# Patient Record
Sex: FEMALE | Race: BLACK OR AFRICAN AMERICAN | Employment: UNEMPLOYED | URBAN - METROPOLITAN AREA
[De-identification: names, ages, dates, MRNs, and addresses within clinical notes are randomized per-mention and may not be internally consistent; named-entity substitution may affect disease eponyms.]

---

## 2018-06-08 ENCOUNTER — TELEPHONE (OUTPATIENT)
Dept: WOUND CARE | Age: 73
End: 2018-06-08

## 2018-06-11 ENCOUNTER — HOSPITAL ENCOUNTER (OUTPATIENT)
Dept: WOUND CARE | Age: 73
Discharge: HOME | End: 2018-06-11
Payer: MEDICAID

## 2018-06-11 VITALS
DIASTOLIC BLOOD PRESSURE: 82 MMHG | TEMPERATURE: 98.5 F | HEIGHT: 64 IN | SYSTOLIC BLOOD PRESSURE: 122 MMHG | RESPIRATION RATE: 18 BRPM

## 2018-06-11 PROCEDURE — 29581 APPL MULTLAYER CMPRN SYS LEG: CPT

## 2018-06-11 PROCEDURE — 99214 OFFICE O/P EST MOD 30 MIN: CPT

## 2018-06-11 RX ORDER — LIDOCAINE HYDROCHLORIDE 20 MG/ML
JELLY TOPICAL AS NEEDED
Status: DISCONTINUED | OUTPATIENT
Start: 2018-06-11 | End: 2018-06-15 | Stop reason: HOSPADM

## 2018-06-11 RX ORDER — ALBUTEROL SULFATE 2.5 MG/.5ML
2.5 SOLUTION RESPIRATORY (INHALATION)
COMMUNITY

## 2018-06-11 RX ORDER — LISINOPRIL AND HYDROCHLOROTHIAZIDE 10; 12.5 MG/1; MG/1
1 TABLET ORAL DAILY
COMMUNITY

## 2018-06-11 RX ORDER — LOPERAMIDE HCL 2 MG
2 TABLET ORAL
COMMUNITY

## 2018-06-11 RX ORDER — BISMUTH SUBSALICYLATE 262 MG
1 TABLET,CHEWABLE ORAL DAILY
COMMUNITY

## 2018-06-11 RX ORDER — CALCIUM CARBONATE/VITAMIN D3 250-3.125
1 TABLET ORAL DAILY
COMMUNITY

## 2018-06-11 RX ORDER — AMLODIPINE BESYLATE 10 MG/1
10 TABLET ORAL DAILY
COMMUNITY

## 2018-06-11 RX ORDER — ACETAMINOPHEN 325 MG/1
650 TABLET ORAL
COMMUNITY

## 2018-06-11 RX ORDER — ASCORBIC ACID 500 MG
TABLET ORAL 2 TIMES DAILY
COMMUNITY

## 2018-06-11 RX ORDER — ONDANSETRON 4 MG/1
4 TABLET, FILM COATED ORAL
COMMUNITY

## 2018-06-11 RX ORDER — ERGOCALCIFEROL 1.25 MG/1
50000 CAPSULE ORAL
COMMUNITY

## 2018-06-11 RX ORDER — IPRATROPIUM BROMIDE AND ALBUTEROL SULFATE 2.5; .5 MG/3ML; MG/3ML
3 SOLUTION RESPIRATORY (INHALATION)
COMMUNITY

## 2018-06-11 RX ORDER — GLIPIZIDE 5 MG/1
TABLET ORAL DAILY
COMMUNITY

## 2018-06-11 NOTE — WOUND CARE
06/11/18 1027   Wound Leg Medial;Right   Date First Assessed/Time First Assessed: 06/11/18 1019   POA: Yes  Wound Type: Venous  Location: Leg  Orientation: Medial;Right   DRESSING STATUS New drainage   DRESSING TYPE Gauze;Gauze wrap (tex)   Non-Pressure Injury Full thickness (subcut/muscle)   Wound Length (cm) 0.7 cm   Wound Width (cm) 1.1 cm   Wound Depth (cm) 0.2   Wound Surface area (cm^2) 0.77 cm^2   Condition of Base Pink   Tissue Type Pink;Red   Drainage Amount  Small    Drainage Color Sanguinous   Wound Odor None   Periwound Skin Condition Other (comment)  (dry, flaky )   Wound Ankle Right;Lateral   Date First Assessed/Time First Assessed: 06/11/18 1026   POA: Yes  Wound Type: Venous  Location: Ankle  Orientation: Right;Lateral   DRESSING STATUS Old drainage   DRESSING TYPE Gauze;Gauze wrap (tex)   Non-Pressure Injury Full thickness (subcut/muscle)   Wound Length (cm) 3.4 cm   Wound Width (cm) 3.5 cm   Wound Depth (cm) 0.3   Wound Surface area (cm^2) 11.9 cm^2   Condition of Base Slough   Condition of Edges Open   Tissue Type Yellow   Drainage Amount  Small    Drainage Color Purulent   Wound Odor None   Periwound Skin Condition Intact   Cleansing and Cleansing Agents  Normal saline   Wound Leg Anterior; Left   Date First Assessed/Time First Assessed: 06/11/18 1027   POA: Yes  Wound Type: (c) Other  Location: Leg  Orientation: Anterior; Left   DRESSING STATUS Clean, dry, and intact   DRESSING TYPE Gauze;Gauze wrap (tex)   Non-Pressure Injury Partial thickness (epider/derm)   Wound Length (cm) 3 cm   Wound Width (cm) 1.5 cm   Wound Depth (cm) 0.1   Wound Surface area (cm^2) 4.5 cm^2   Condition of Base Epithelializing   Drainage Amount  None   Wound Odor None   Periwound Skin Condition Intact   Cleansing and Cleansing Agents  Normal saline

## 2018-06-12 NOTE — WOUND CARE
[facility] H&P  Assessment/Plan:  Right leg venous stasis ulcers (L97.912, I87.331)    - Pt evaluated and treated. - Due to presence of non-viable tissue debridement of wound was indicated. Risks of procedure (bleeding, infection, pain) were discussed with patient, all questions/concerns were addressed, and consent was signed. Wound debrided as noted in the Procedure Note to healthy granular bleeding margins.  - Discussed how biotin can be a helpful supplement to aid in wound healing, 3-5 mg PO daily to be taken with food. - Skin lubricant applied, discussed with pt importance of moisturizing skin and how it can help prevent development of ulcerations and cellulitis. - Dressing consisting of  endoform and  applied.  - F/U 1 week. Subjective:  Pt complains of wound to both legs  Previous tx include topical medications. Neg for fever, chills, nausea, vomiting, chest pain, shortness of breath. HPI:  Ms. Jean King is a 81F who presents with wounds to her right and left legs. She has had these wounds in the past and has been treated with with topical medication. Sometimes her legs swell. She reports living in a nursing home. The wounds can sometimes be painful. History is somewhat limited. REVIEW OF SYSTEMS:   CONSTITUTIONAL: No fever, chills, weakness or fatigue. SKIN: multiple leg ulcers  CARDIOVASCULAR: No chest pain, no palpitations, no chest discomfort  RESPIRATORY: No shortness of breath, cough or sputum. GASTROINTESTINAL: No anorexia, nausea, vomiting or diarrhea. No abdominal pain or blood. NEUROLOGICAL: No headache, dizziness, syncope, paralysis, ataxia  MUSCULOSKELETAL: No muscle, back pain, joint pain or stiffness. HEMATOLOGIC: No excessive bleeding, no excessive bruising.    LYMPHATICS: No enlarged nodes, no palpable lymph node mases  PSYCHIATRIC: Denies feeling depressed, anxious   ENDOCRINOLOGIC: No reports of sweating, cold or heat intolerance. History:  Bilateral lower leg  Allergies   Allergen Reactions    Abilify [Aripiprazole] Unknown (comments)    Clozapine Unknown (comments)    Geodon [Ziprasidone Hcl] Unknown (comments)    Haldol [Haloperidol Lactate] Unknown (comments)    Thorazine [Chlorpromazine] Unknown (comments)     Family History   Problem Relation Age of Onset    Heart Disease Mother       Past Medical History:   Diagnosis Date    Chronic kidney disease     Diabetes ([facility] [address]     Hyperlipidemia     Hypertension     Ill-defined condition     Dementia      Past Surgical History:   Procedure Laterality Date    HX HEENT      bilateral cataract removed    HX ORTHOPAEDIC  3/7/13    RIGHT SHOULDER REVERSE ARTHROPLASTY     Social History   Substance Use Topics    Smoking status: Never Smoker    Smokeless tobacco: Never Used    Alcohol use No       History   Alcohol Use No     History   Drug Use No      History   Smoking Status    Never Smoker   Smokeless Tobacco    Never Used     Current Outpatient Prescriptions   Medication Sig    amLODIPine (NORVASC) 10 mg tablet Take 10 mg by mouth daily.  albuterol-ipratropium (DUO-NEB) 2.5 mg-0.5 mg/3 ml nebu 3 mL by Nebulization route.  albuterol sulfate (PROVENTIL;VENTOLIN) 2.5 mg/0.5 mL nebu nebulizer solution 2.5 mg by Nebulization route every four (4) hours as needed for Wheezing.  lidocaine/benzalkonium chlorid (A+D CRACKED SKIN RELIEF EX) by Apply Externally route.  glipiZIDE (GLUCOTROL) 5 mg tablet Take  by mouth daily.  loperamide (IMMODIUM) 2 mg tablet Take 2 mg by mouth every eight (8) hours as needed for Diarrhea.  lisinopril-hydroCHLOROthiazide (PRINZIDE, ZESTORETIC) 10-12.5 mg per tablet Take 1 Tab by mouth daily.  multivitamin (ONE A DAY) tablet Take 1 Tab by mouth daily.  calcium-vitamin D (OYSTER SHELL CALCIUM-VIT D3) 250-125 mg-unit tablet Take 1 Tab by mouth daily.     acetaminophen (TYLENOL) 325 mg tablet Take 650 mg by mouth every eight (8) hours as needed for Pain.  ascorbic acid, vitamin C, (VITAMIN C) 500 mg tablet Take  by mouth two (2) times a day.  ergocalciferol (ERGOCALCIFEROL) 50,000 unit capsule Take 50,000 Units by mouth.  ondansetron hcl (ZOFRAN) 4 mg tablet Take 4 mg by mouth every six (6) hours as needed for Nausea.  aspirin delayed-release 325 mg tablet Take 1 Tab by mouth daily.  cloNIDine (CATAPRES) 0.1 mg tablet Take 1 Tab by mouth two (2) times a day.  oxyCODONE-acetaminophen (PERCOCET) 5-325 mg per tablet Take 2 Tabs by mouth every four (4) hours as needed for Pain.  furosemide (LASIX) 40 mg tablet 1 po daily    metoprolol (LOPRESSOR) 100 mg tablet Take 1 Tab by mouth two (2) times a day.  pioglitazone (ACTOS) 30 mg tablet Take 1 Tab by mouth daily. Current Facility-Administered Medications   Medication Dose Route Frequency    lidocaine (XYLOCAINE) 2 % jelly   Topical PRN        Objective:  Visit Vitals    /82 (BP 1 Location: Right arm, BP Patient Position: At rest)    Temp 98.5 °F (36.9 °C)    Resp 18    Ht 5' 4\" (1.626 m)    Breastfeeding No       Vascular:  Right DP 1/4; PT 1/4  Left DP 1/4; PT 1/4  Capillary fill time <2 seconds  Edema: bilateral pitting edema to the legs  Skin temperature is cool  Varicosities are absent  Bilateral legs exhibit hemosiderin deposits    Dermatological:  Nails are thickened, elongated, discolored, painful to palpation, 2 mm thick, with subungual debris. Skin is dry and scaly   Skin is normal temp and turgor  No evidence of tinea pedis  There is no maceration of the interspaces of the feet b/l. Hyperkeratotic lesions present at not present. Wound #1  Location: right leg medial  Etiology: venous  Size: see nursing notes  Margins: hyperkeratotic  Drainage: serous  Odor: none  Wound base: slough/fibrin  Lymphangitic streaking? No.  Undermining? No.  Sinus tracts? No.  Probes to bone? NO  Subcutaneous crepitus? No  Fluctuance?  No    Wound #2  Location: right leg lateral  Etiology: venous  Size: see nursing notes  Margins: hyperkeratotic  Drainage: serous  Odor: none  Wound base: slough/fibrin  Lymphangitic streaking? No.  Undermining? No.  Sinus tracts? No.  Probes to bone? No  Subcutaneous crepitus? No  Fluctuance? No     Wound #3  Location: left leg anterior  No wound, but maybe one previously    Neurological:  Protective sensation per 5.07 [place] Annita monofilament is reduced bilaterally  Vibratory sensation at the Rt 1st MPJ is present LT 1st MPJ present  Epicritic sensation is intact. Patient is AAOx3, mood is normal.     Orthopedic:  B/L LE are symmetric  ROM of ankle, STJ, 1st MTPJ is limited, MMT 5 out of 5 for B/L LE. No pedal amputations    Constitutional: Pt is a well developed, overweight  73F. Lymphatics: negative tenderness to palpation of neck/axillary/inguinal nodes. Imaging / Labs / Cx / Px:    4/17/18 JEFF/PVR Right 1.0 from [facility] [address], [facility], [facility], [address] [place] [address]. Juan [address] [facility], [place], [address] [facility]  P: [phone]  F: [phone]

## 2018-06-13 ENCOUNTER — TELEPHONE (OUTPATIENT)
Dept: WOUND CARE | Age: 73
End: 2018-06-13

## 2018-06-19 ENCOUNTER — APPOINTMENT (OUTPATIENT)
Dept: WOUND CARE | Age: 73
End: 2018-06-19

## 2018-06-25 ENCOUNTER — HOSPITAL ENCOUNTER (OUTPATIENT)
Dept: WOUND CARE | Age: 73
Discharge: HOME | End: 2018-06-25
Payer: MEDICAID

## 2018-06-25 VITALS
RESPIRATION RATE: 17 BRPM | SYSTOLIC BLOOD PRESSURE: 104 MMHG | WEIGHT: 239 LBS | DIASTOLIC BLOOD PRESSURE: 71 MMHG | TEMPERATURE: 97.5 F | BODY MASS INDEX: 41.02 KG/M2

## 2018-06-25 PROCEDURE — 11042 DBRDMT SUBQ TIS 1ST 20SQCM/<: CPT

## 2018-06-25 PROCEDURE — 74011000250 HC RX REV CODE- 250: Performed by: PODIATRIST

## 2018-06-25 RX ORDER — LIDOCAINE HYDROCHLORIDE 20 MG/ML
JELLY TOPICAL AS NEEDED
Status: DISCONTINUED | OUTPATIENT
Start: 2018-06-25 | End: 2018-06-29 | Stop reason: HOSPADM

## 2018-06-25 RX ORDER — SULFAMETHOXAZOLE AND TRIMETHOPRIM 800; 160 MG/1; MG/1
1 TABLET ORAL 2 TIMES DAILY
COMMUNITY
Start: 2018-06-19 | End: 2018-06-29

## 2018-06-25 RX ADMIN — LIDOCAINE HYDROCHLORIDE: 20 JELLY TOPICAL at 10:23

## 2018-06-25 NOTE — WOUND CARE
06/25/18 1016   Wound Heel Left   Date First Assessed/Time First Assessed: 06/25/18 1018   POA: Yes  Wound Type: Pressure injury  Location: Heel  Orientation: Left   DRESSING STATUS Other (comment)  (none)   Pressure Injury DTI   Wound Length (cm) 3.5 cm   Wound Width (cm) 7.5 cm   Wound Depth (cm) 0.1   Wound Surface area (cm^2) 26.25 cm^2   Tissue Type Maroon/purple;Black   Wound Leg Medial;Right   Date First Assessed/Time First Assessed: 06/11/18 1019   POA: Yes  Wound Type: Venous  Location: Leg  Orientation: Medial;Right   DRESSING STATUS Old drainage;Removed   DRESSING TYPE Silver products;Gauze;Gauze wrap (tex)   Non-Pressure Injury Full thickness (subcut/muscle)   Wound Length (cm) 1.6 cm   Wound Width (cm) 1 cm   Wound Depth (cm) 0.1   Wound Surface area (cm^2) 1.6 cm^2   Condition of Base Pink;Granulation   Tissue Type Pink   Drainage Amount  Small    Drainage Color Serous   Wound Odor None   Periwound Skin Condition Other (comment)  (Dry flaky skin)   Cleansing and Cleansing Agents  Normal saline   Wound Ankle Right;Lateral   Date First Assessed/Time First Assessed: 06/11/18 1026   POA: Yes  Wound Type: Venous  Location: Ankle  Orientation: Right;Lateral   DRESSING STATUS Old drainage;Removed   DRESSING TYPE Silver products;Gauze;Gauze wrap (tex)   Non-Pressure Injury Full thickness (subcut/muscle)   Wound Length (cm) 1.4 cm   Wound Width (cm) 2.5 cm   Wound Depth (cm) 0.5   Wound Surface area (cm^2) 3.5 cm^2   Condition of Base Slough;Pink   Tissue Type Yellow;Pink   Drainage Amount  Small    Drainage Color Tan   Wound Odor None   Periwound Skin Condition Intact   Cleansing and Cleansing Agents  Normal saline   Wound Leg Anterior; Left   Date First Assessed/Time First Assessed: 06/11/18 1027   POA: Yes  Wound Type: (c) Other  Location: Leg  Orientation: Anterior; Left   DRESSING STATUS Other (comment)  (none)   Wound Length (cm) 0 cm   Wound Width (cm) 0 cm   Wound Depth (cm) 0   Wound Surface area (cm^2) 0 cm^2   Condition of Base Epithelializing

## 2018-06-25 NOTE — WOUND CARE
[facility] Follow up   Assessment/Plan:  Right leg venous stasis ulcers (L97.912, I87.331), Stage 1 right pressure heel ulcer (L89.601)    - Pt evaluated and treated. - Due to presence of non-viable tissue debridement of wound was indicated. Risks of procedure (bleeding, infection, pain) were discussed with patient, all questions/concerns were addressed, and consent was signed. Wound debrided as noted in the Procedure Note to healthy granular bleeding margins.  - Discussed how biotin can be a helpful supplement to aid in wound healing, 3-5 mg PO daily to be taken with food. - Skin lubricant applied, discussed with pt importance of moisturizing skin and how it can help prevent development of ulcerations and cellulitis. - Dressing consisting of  endoform DSD  Applied.  - Compressed with 3 layer compression  - Recommend offloading right heel with pillows or prevelons at all times. - F/U 1 week. Subjective:  Pt complains of wound to right medial and lateral leg. Wounds improving with compression. Complains of pain to right heel. Neg for fever, chills, nausea, vomiting, chest pain, shortness of breath. HPI:  Ms. Kristen Steve is a 81F who presents with wounds to her right and left legs. She has had these wounds in the past and has been treated with with topical medication. Sometimes her legs swell. She reports living in a nursing home. The wounds can sometimes be painful. History is somewhat limited. REVIEW OF SYSTEMS:   CONSTITUTIONAL: No fever, chills, weakness or fatigue. SKIN: multiple leg ulcers  CARDIOVASCULAR: No chest pain, no palpitations, no chest discomfort  RESPIRATORY: No shortness of breath, cough or sputum. GASTROINTESTINAL: No anorexia, nausea, vomiting or diarrhea. No abdominal pain or blood. NEUROLOGICAL: No headache, dizziness, syncope, paralysis, ataxia  MUSCULOSKELETAL: No muscle, back pain, joint pain or stiffness.    HEMATOLOGIC: No excessive bleeding, no excessive bruising. LYMPHATICS: No enlarged nodes, no palpable lymph node mases  PSYCHIATRIC: Denies feeling depressed, anxious   ENDOCRINOLOGIC: No reports of sweating, cold or heat intolerance. History:  Bilateral lower leg  Allergies   Allergen Reactions    Abilify [Aripiprazole] Unknown (comments)    Clozapine Unknown (comments)    Geodon [Ziprasidone Hcl] Unknown (comments)    Haldol [Haloperidol Lactate] Unknown (comments)    Thorazine [Chlorpromazine] Unknown (comments)     Family History   Problem Relation Age of Onset    Heart Disease Mother       Past Medical History:   Diagnosis Date    Chronic kidney disease     Diabetes ([facility] [address]     Hyperlipidemia     Hypertension     Ill-defined condition     Dementia      Past Surgical History:   Procedure Laterality Date    HX HEENT      bilateral cataract removed    HX ORTHOPAEDIC  3/7/13    RIGHT SHOULDER REVERSE ARTHROPLASTY     Social History   Substance Use Topics    Smoking status: Never Smoker    Smokeless tobacco: Never Used    Alcohol use No       History   Alcohol Use No     History   Drug Use No      History   Smoking Status    Never Smoker   Smokeless Tobacco    Never Used     Current Outpatient Prescriptions   Medication Sig    trimethoprim-sulfamethoxazole (BACTRIM DS) 160-800 mg per tablet Take 1 Tab by mouth two (2) times a day.  ascorbic acid, vitamin C, (VITAMIN C) 500 mg tablet Take  by mouth two (2) times a day.  amLODIPine (NORVASC) 10 mg tablet Take 10 mg by mouth daily.  albuterol-ipratropium (DUO-NEB) 2.5 mg-0.5 mg/3 ml nebu 3 mL by Nebulization route.  albuterol sulfate (PROVENTIL;VENTOLIN) 2.5 mg/0.5 mL nebu nebulizer solution 2.5 mg by Nebulization route every four (4) hours as needed for Wheezing.  lidocaine/benzalkonium chlorid (A+D CRACKED SKIN RELIEF EX) by Apply Externally route.  glipiZIDE (GLUCOTROL) 5 mg tablet Take  by mouth daily.     loperamide (IMMODIUM) 2 mg tablet Take 2 mg by mouth every eight (8) hours as needed for Diarrhea.  lisinopril-hydroCHLOROthiazide (PRINZIDE, ZESTORETIC) 10-12.5 mg per tablet Take 1 Tab by mouth daily.  multivitamin (ONE A DAY) tablet Take 1 Tab by mouth daily.  calcium-vitamin D (OYSTER SHELL CALCIUM-VIT D3) 250-125 mg-unit tablet Take 1 Tab by mouth daily.  acetaminophen (TYLENOL) 325 mg tablet Take 650 mg by mouth every eight (8) hours as needed for Pain.  ergocalciferol (ERGOCALCIFEROL) 50,000 unit capsule Take 50,000 Units by mouth.  ondansetron hcl (ZOFRAN) 4 mg tablet Take 4 mg by mouth every six (6) hours as needed for Nausea.  aspirin delayed-release 325 mg tablet Take 1 Tab by mouth daily.  cloNIDine (CATAPRES) 0.1 mg tablet Take 1 Tab by mouth two (2) times a day.  oxyCODONE-acetaminophen (PERCOCET) 5-325 mg per tablet Take 2 Tabs by mouth every four (4) hours as needed for Pain.  furosemide (LASIX) 40 mg tablet 1 po daily    metoprolol (LOPRESSOR) 100 mg tablet Take 1 Tab by mouth two (2) times a day.  pioglitazone (ACTOS) 30 mg tablet Take 1 Tab by mouth daily. Current Facility-Administered Medications   Medication Dose Route Frequency    lidocaine (XYLOCAINE) 2 % jelly   Topical PRN        Objective:  Visit Vitals    /71 (BP 1 Location: Right arm, BP Patient Position: Sitting)    Temp 97.5 °F (36.4 °C)    Resp 17    Wt 108.4 kg (239 lb)    BMI 41.02 kg/m2       Vascular:  Right DP 1/4; PT 1/4  Left DP 1/4; PT 1/4  Capillary fill time <2 seconds  Edema: bilateral pitting edema to the legs  Skin temperature is cool  Varicosities are absent  Bilateral legs exhibit hemosiderin deposits    Dermatological:  Nails are thickened, elongated, discolored, painful to palpation, 2 mm thick, with subungual debris. Skin is dry and scaly   Skin is normal temp and turgor  No evidence of tinea pedis  There is no maceration of the interspaces of the feet b/l.     Hyperkeratotic lesions present at not present. Wound #1  Location: right leg medial  Etiology: venous  Size: 1.6x1x0.1cm  Margins: hyperkeratotic  Drainage: serous  Odor: none  Wound base: granular  Lymphangitic streaking? No.  Undermining? No.  Sinus tracts? No.  Probes to bone? NO  Subcutaneous crepitus? No  Fluctuance? No    Wound #2  Location: right leg lateral  Etiology: venous  Size: 1.4x2.5x0.5cm  Margins: hyperkeratotic  Drainage: serous  Odor: none  Wound base: slough/fibrin  Lymphangitic streaking? No.  Undermining? No.  Sinus tracts? No.  Probes to bone? No  Subcutaneous crepitus? No  Fluctuance? No     Wound #3  Location: right heel  Etiology: pressure   Size: 3.5x7.5x0.1cm  Margins: deep tissue injury  Drainage: none  Odor: none  Wound base: skin   Lymphangitic streaking? No.  Undermining? No.  Sinus tracts? No.  Probes to bone? No  Subcutaneous crepitus? No  Fluctuance? No     Neurological:  Protective sensation per 5.07 [place] Annita monofilament is reduced bilaterally  Vibratory sensation at the Rt 1st MPJ is present LT 1st MPJ present  Epicritic sensation is intact. Patient is AAOx3, mood is normal.     Orthopedic:  B/L LE are symmetric  ROM of ankle, STJ, 1st MTPJ is limited, MMT 5 out of 5 for B/L LE. No pedal amputations    Constitutional: Pt is a well developed, overweight  73F. Lymphatics: negative tenderness to palpation of neck/axillary/inguinal nodes. Imaging / Labs / Cx / Px:    4/17/18 JEFF/PVR Right 1.0 from [address] [place], [facility] Ryan [place], [address] [facility], [address] [place] [address]. [address], [place] [address], [place], [address] [facility]  P: [phone]  F: [phone]

## 2018-06-27 ENCOUNTER — APPOINTMENT (OUTPATIENT)
Dept: WOUND CARE | Age: 73
End: 2018-06-27

## 2018-07-03 ENCOUNTER — HOSPITAL ENCOUNTER (OUTPATIENT)
Dept: WOUND CARE | Age: 73
Discharge: HOME | End: 2018-07-03
Payer: MEDICAID

## 2018-07-03 VITALS — RESPIRATION RATE: 16 BRPM | TEMPERATURE: 97.6 F | DIASTOLIC BLOOD PRESSURE: 100 MMHG | SYSTOLIC BLOOD PRESSURE: 141 MMHG

## 2018-07-03 PROCEDURE — 29581 APPL MULTLAYER CMPRN SYS LEG: CPT

## 2018-07-03 PROCEDURE — 74011000250 HC RX REV CODE- 250: Performed by: PODIATRIST

## 2018-07-03 RX ORDER — LIDOCAINE HYDROCHLORIDE 20 MG/ML
JELLY TOPICAL ONCE
Status: COMPLETED | OUTPATIENT
Start: 2018-07-03 | End: 2018-07-03

## 2018-07-03 RX ADMIN — LIDOCAINE HYDROCHLORIDE: 20 JELLY TOPICAL at 14:20

## 2018-07-03 NOTE — WOUND CARE
[address] [place] [facility]P  Assessment/Plan:  Right leg venous stasis ulcers (L97.912, I87.331), Left heel DTI (L89.620)    - Pt evaluated and treated. - Will change to Aquacel Ag+ and DSD applied for the right leg wounds  - Compressed with 3 layer compression on the right  - Betadine open to air for the left heel  - Recommend offloading right heel with pillows or prevalons at all times. Discussed with  from NH to make sure that she gets an offloading boot. Written on orders to NH.   - F/U 1 week. Subjective:  Pt complains of wound to both legs. Right leg is less swollen today. Neg for fever, chills, nausea, vomiting, chest pain, shortness of breath. HPI:  Ms. Jared Ortega is a 81F who presents with wounds to her right and left legs. She has had these wounds in the past and has been treated with with topical medication. Sometimes her legs swell. She reports living in a nursing home. The wounds can sometimes be painful. History is somewhat limited.      History:  Bilateral lower leg  Allergies   Allergen Reactions    Abilify [Aripiprazole] Unknown (comments)    Clozapine Unknown (comments)    Geodon [Ziprasidone Hcl] Unknown (comments)    Haldol [Haloperidol Lactate] Unknown (comments)    Thorazine [Chlorpromazine] Unknown (comments)     Family History   Problem Relation Age of Onset    Heart Disease Mother       Past Medical History:   Diagnosis Date    Chronic kidney disease     Diabetes ([facility] [address]     Hyperlipidemia     Hypertension     Ill-defined condition     Dementia      Past Surgical History:   Procedure Laterality Date    HX HEENT      bilateral cataract removed    HX ORTHOPAEDIC  3/7/13    RIGHT SHOULDER REVERSE ARTHROPLASTY     Social History   Substance Use Topics    Smoking status: Never Smoker    Smokeless tobacco: Never Used    Alcohol use No       History   Alcohol Use No     History   Drug Use No      History   Smoking Status    Never Smoker   Smokeless Tobacco    Never Used     Current Outpatient Prescriptions   Medication Sig    amLODIPine (NORVASC) 10 mg tablet Take 10 mg by mouth daily.  albuterol-ipratropium (DUO-NEB) 2.5 mg-0.5 mg/3 ml nebu 3 mL by Nebulization route.  albuterol sulfate (PROVENTIL;VENTOLIN) 2.5 mg/0.5 mL nebu nebulizer solution 2.5 mg by Nebulization route every four (4) hours as needed for Wheezing.  lidocaine/benzalkonium chlorid (A+D CRACKED SKIN RELIEF EX) by Apply Externally route.  glipiZIDE (GLUCOTROL) 5 mg tablet Take  by mouth daily.  loperamide (IMMODIUM) 2 mg tablet Take 2 mg by mouth every eight (8) hours as needed for Diarrhea.  lisinopril-hydroCHLOROthiazide (PRINZIDE, ZESTORETIC) 10-12.5 mg per tablet Take 1 Tab by mouth daily.  multivitamin (ONE A DAY) tablet Take 1 Tab by mouth daily.  calcium-vitamin D (OYSTER SHELL CALCIUM-VIT D3) 250-125 mg-unit tablet Take 1 Tab by mouth daily.  acetaminophen (TYLENOL) 325 mg tablet Take 650 mg by mouth every eight (8) hours as needed for Pain.  ascorbic acid, vitamin C, (VITAMIN C) 500 mg tablet Take  by mouth two (2) times a day.  ergocalciferol (ERGOCALCIFEROL) 50,000 unit capsule Take 50,000 Units by mouth.  ondansetron hcl (ZOFRAN) 4 mg tablet Take 4 mg by mouth every six (6) hours as needed for Nausea.  aspirin delayed-release 325 mg tablet Take 1 Tab by mouth daily.  cloNIDine (CATAPRES) 0.1 mg tablet Take 1 Tab by mouth two (2) times a day.  oxyCODONE-acetaminophen (PERCOCET) 5-325 mg per tablet Take 2 Tabs by mouth every four (4) hours as needed for Pain.  furosemide (LASIX) 40 mg tablet 1 po daily    metoprolol (LOPRESSOR) 100 mg tablet Take 1 Tab by mouth two (2) times a day.  pioglitazone (ACTOS) 30 mg tablet Take 1 Tab by mouth daily. No current facility-administered medications for this encounter.          Objective:  Visit Vitals    BP (!) 141/100 (BP 1 Location: Right arm, BP Patient Position: Sitting)    Temp 97.6 °F (36.4 °C)    Resp 16       Vascular:  Right DP 1/4; PT 1/4  Left DP 1/4; PT 1/4  Capillary fill time <2 seconds  Edema: bilateral pitting edema to the legs  Skin temperature is cool  Varicosities are absent  Bilateral legs exhibit hemosiderin deposits    Dermatological:  Nails are thickened, elongated, discolored, painful to palpation, 2 mm thick, with subungual debris. Skin is dry and scaly   Skin is normal temp and turgor  No evidence of tinea pedis  There is no maceration of the interspaces of the feet b/l. Hyperkeratotic lesions present at not present. Wound #1  Location: right leg medial  Etiology: venous  Size: see nursing notes  Margins: hyperkeratotic  Drainage: serous  Odor: none  Wound base: less slough/fibrin  Lymphangitic streaking? No.  Undermining? No.  Sinus tracts? No.  Probes to bone? No  Subcutaneous crepitus? No  Fluctuance? No    Wound #2  Location: right leg lateral  Etiology: venous  Size: see nursing notes  Margins: hyperkeratotic  Drainage: serous  Odor: none  Wound base: less slough/fibrin  Lymphangitic streaking? No.  Undermining? No.  Sinus tracts? No.  Probes to bone? No  Subcutaneous crepitus? No  Fluctuance? No     Wound #3  Location: left heel DTI  Etiology: pressure   Size: see nursing notes  Margins: deep tissue injury  Drainage: none  Odor: none  Wound base: skin is intact, dark maroon color  Lymphangitic streaking? No.  Undermining? No.  Sinus tracts? No.  Probes to bone? No  Subcutaneous crepitus? No  Fluctuance? No     Neurological:  Protective sensation per 5.07 [place] Annita monofilament is reduced bilaterally  Vibratory sensation at the Rt 1st MPJ is present LT 1st MPJ present  Epicritic sensation is intact. Patient is AAOx3, mood is normal.     Orthopedic:  B/L LE are symmetric  ROM of ankle, STJ, 1st MTPJ is limited, MMT 5 out of 5 for B/L LE.     No pedal amputations    Constitutional: Pt is a well developed, overweight  73F.    Lymphatics: negative tenderness to palpation of neck/axillary/inguinal nodes. Imaging / Labs / Cx / Px:    4/17/18 JEFF/PVR Right 1.0 from Jose Antonio Weinberg DPM - Dre Smith, [address] [place] [address], [address] [place] [address]. Juan C [address] [place], [facility], [place], [address] [facility]  P: [phone]  F: [phone]

## 2018-07-03 NOTE — WOUND CARE
07/03/18 1420   Wound Heel Left   Date First Assessed/Time First Assessed: 06/25/18 1018   POA: Yes  Wound Type: Pressure injury  Location: Heel  Orientation: Left   DRESSING STATUS Clean, dry, and intact   Pressure Injury DTI   Wound Length (cm) 5.4 cm   Wound Width (cm) 6 cm   Wound Depth (cm) 0   Wound Surface area (cm^2) 32.4 cm^2   Tissue Type Maroon/purple   Cleansing and Cleansing Agents  Normal saline   Wound Leg Medial;Right   Date First Assessed/Time First Assessed: 06/11/18 1019   POA: Yes  Wound Type: Venous  Location: Leg  Orientation: Medial;Right   DRESSING STATUS Clean, dry, and intact   Wound Length (cm) 0.1 cm   Wound Width (cm) 0.1 cm   Wound Depth (cm) 0   Wound Surface area (cm^2) 0.01 cm^2   Condition of Base Pink;Epithelializing   Drainage Amount  None   Wound Odor None   Cleansing and Cleansing Agents  Normal saline   Wound Ankle Right;Lateral   Date First Assessed/Time First Assessed: 06/11/18 1026   POA: Yes  Wound Type: Venous  Location: Ankle  Orientation: Right;Lateral   DRESSING STATUS Old drainage   Wound Length (cm) 1.8 cm   Wound Width (cm) 2.3 cm   Wound Depth (cm) 0.5   Wound Surface area (cm^2) 4.14 cm^2   Condition of Base Pink   Drainage Amount  Small    Drainage Color Serosanguinous   Wound Odor None   Cleansing and Cleansing Agents  Normal saline

## 2018-07-10 ENCOUNTER — APPOINTMENT (OUTPATIENT)
Dept: WOUND CARE | Age: 73
End: 2018-07-10
Payer: MEDICAID

## 2018-07-11 ENCOUNTER — HOSPITAL ENCOUNTER (OUTPATIENT)
Dept: WOUND CARE | Age: 73
Discharge: HOME | End: 2018-07-11
Payer: MEDICAID

## 2018-07-11 VITALS
HEART RATE: 65 BPM | DIASTOLIC BLOOD PRESSURE: 81 MMHG | TEMPERATURE: 97.5 F | SYSTOLIC BLOOD PRESSURE: 139 MMHG | RESPIRATION RATE: 17 BRPM

## 2018-07-11 PROCEDURE — 11042 DBRDMT SUBQ TIS 1ST 20SQCM/<: CPT

## 2018-07-11 PROCEDURE — 74011000250 HC RX REV CODE- 250: Performed by: PODIATRIST

## 2018-07-11 RX ORDER — LIDOCAINE 40 MG/G
CREAM TOPICAL
Status: COMPLETED | OUTPATIENT
Start: 2018-07-11 | End: 2018-07-11

## 2018-07-11 RX ADMIN — LIDOCAINE: 4 CREAM TOPICAL at 11:42

## 2018-07-11 NOTE — WOUND CARE
07/11/18 1134 Wound Heel Left Date First Assessed/Time First Assessed: 06/25/18 1018   POA: Yes  Wound Type: Pressure injury  Location: Heel  Orientation: Left Pressure Injury DTI Wound Length (cm) 4.5 cm Wound Width (cm) 6 cm Wound Depth (cm) 0 Wound Surface area (cm^2) 27 cm^2 Tissue Type Maroon/purple Drainage Amount  None Wound Odor None Wound Leg Medial;Right Date First Assessed/Time First Assessed: 06/11/18 1019   POA: Yes  Wound Type: Venous  Location: Leg  Orientation: Medial;Right DRESSING TYPE Compression Wrap/Venous Stasis Non-Pressure Injury Partial thickness (epider/derm) Wound Length (cm) 0.1 cm Wound Width (cm) 0.1 cm Wound Depth (cm) 0.1 Wound Surface area (cm^2) 0.01 cm^2 Condition of Base Pink;Epithelializing Wound Leg Right;Lateral;Posterior Date First Assessed/Time First Assessed: 06/11/18 1026   POA: Yes  Wound Type: Venous  Location: Leg  Orientation: Right;Lateral;Posterior DRESSING STATUS Old drainage DRESSING TYPE Compression Wrap/Venous Stasis Non-Pressure Injury Full thickness (subcut/muscle) Wound Length (cm) 1.9 cm Wound Width (cm) 2.6 cm Wound Depth (cm) 0.5 Wound Surface area (cm^2) 4.94 cm^2 Condition of Base Pink Condition of Edges Open Tissue Type Red Drainage Amount  Moderate Drainage Color Purulent Wound Odor None Periwound Skin Condition Intact Cleansing and Cleansing Agents  Normal saline

## 2018-07-12 NOTE — WOUND CARE
Podiatric Surgery - [address] - Progress Note  Assessment/Plan:  Right leg venous stasis ulcers (L97.912, I87.331), Left heel DTI (L89.620)    - Pt evaluated and treated. - Will continue with Aquacel Ag+ and DSD, as the medial ulceration has healed with this, will add zinc oxide to periwound. - Compressed with 3 layer compression on the right  - Betadine open to air for the left heel  - Recommend offloading right heel with pillows or prevalons at all times. Discussed with  from NH to make sure that she gets an offloading boot. Written on orders to NH.   - F/U 1 week. Subjective:  Pt complains of wounds to both legs. Right leg is less swollen today, thinks one ulcer has healed. Neg for fever, chills, nausea, vomiting, chest pain, shortness of breath. HPI:  Ms. Robert Barnes is a 81F who presents with wounds to her right and left legs. She has had these wounds in the past and has been treated with with topical medication. Sometimes her legs swell. She reports living in a nursing home. The wounds can sometimes be painful. History is somewhat limited.      History:  Bilateral lower leg  Allergies   Allergen Reactions    Abilify [Aripiprazole] Unknown (comments)    Clozapine Unknown (comments)    Geodon [Ziprasidone Hcl] Unknown (comments)    Haldol [Haloperidol Lactate] Unknown (comments)    Thorazine [Chlorpromazine] Unknown (comments)     Family History   Problem Relation Age of Onset    Heart Disease Mother       Past Medical History:   Diagnosis Date    Chronic kidney disease     Diabetes ([facility] [address]     Hyperlipidemia     Hypertension     Ill-defined condition     Dementia      Past Surgical History:   Procedure Laterality Date    HX HEENT      bilateral cataract removed    HX ORTHOPAEDIC  3/7/13    RIGHT SHOULDER REVERSE ARTHROPLASTY     Social History   Substance Use Topics    Smoking status: Never Smoker    Smokeless tobacco: Never Used    Alcohol use No History   Alcohol Use No     History   Drug Use No      History   Smoking Status    Never Smoker   Smokeless Tobacco    Never Used     Current Outpatient Prescriptions   Medication Sig    amLODIPine (NORVASC) 10 mg tablet Take 10 mg by mouth daily.  albuterol-ipratropium (DUO-NEB) 2.5 mg-0.5 mg/3 ml nebu 3 mL by Nebulization route.  albuterol sulfate (PROVENTIL;VENTOLIN) 2.5 mg/0.5 mL nebu nebulizer solution 2.5 mg by Nebulization route every four (4) hours as needed for Wheezing.  lidocaine/benzalkonium chlorid (A+D CRACKED SKIN RELIEF EX) by Apply Externally route.  glipiZIDE (GLUCOTROL) 5 mg tablet Take  by mouth daily.  loperamide (IMMODIUM) 2 mg tablet Take 2 mg by mouth every eight (8) hours as needed for Diarrhea.  lisinopril-hydroCHLOROthiazide (PRINZIDE, ZESTORETIC) 10-12.5 mg per tablet Take 1 Tab by mouth daily.  multivitamin (ONE A DAY) tablet Take 1 Tab by mouth daily.  calcium-vitamin D (OYSTER SHELL CALCIUM-VIT D3) 250-125 mg-unit tablet Take 1 Tab by mouth daily.  acetaminophen (TYLENOL) 325 mg tablet Take 650 mg by mouth every eight (8) hours as needed for Pain.  ascorbic acid, vitamin C, (VITAMIN C) 500 mg tablet Take  by mouth two (2) times a day.  ergocalciferol (ERGOCALCIFEROL) 50,000 unit capsule Take 50,000 Units by mouth.  ondansetron hcl (ZOFRAN) 4 mg tablet Take 4 mg by mouth every six (6) hours as needed for Nausea.  aspirin delayed-release 325 mg tablet Take 1 Tab by mouth daily.  cloNIDine (CATAPRES) 0.1 mg tablet Take 1 Tab by mouth two (2) times a day.  oxyCODONE-acetaminophen (PERCOCET) 5-325 mg per tablet Take 2 Tabs by mouth every four (4) hours as needed for Pain.  furosemide (LASIX) 40 mg tablet 1 po daily    metoprolol (LOPRESSOR) 100 mg tablet Take 1 Tab by mouth two (2) times a day.  pioglitazone (ACTOS) 30 mg tablet Take 1 Tab by mouth daily. No current facility-administered medications for this encounter. Objective:  Visit Vitals    /81 (BP 1 Location: Right arm, BP Patient Position: At rest)    Pulse 65    Temp 97.5 °F (36.4 °C)    Resp 17       Vascular:  Right DP 1/4; PT 1/4  Left DP 1/4; PT 1/4  Capillary fill time <2 seconds  Edema: bilateral pitting edema to the legs  Skin temperature is cool  Varicosities are absent  Bilateral legs exhibit hemosiderin deposits    Dermatological:  Nails are thickened, elongated, discolored, painful to palpation, 2 mm thick, with subungual debris. Skin is dry and scaly   Skin is normal temp and turgor  No evidence of tinea pedis  There is no maceration of the interspaces of the feet b/l. Hyperkeratotic lesions present at not present. Wound #1  Location: right leg medial  Etiology: venous  Size: healed    Wound #2  Location: right leg lateral  Etiology: venous  Size: see nursing notes  Margins: hyperkeratotic  Drainage: serous  Odor: none  Wound base: less slough/fibrin  Lymphangitic streaking? No.  Undermining? No.  Sinus tracts? No.  Probes to bone? No  Subcutaneous crepitus? No  Fluctuance? No     Wound #3  Location: left heel DTI  Etiology: pressure   Size: see nursing notes  Margins: deep tissue injury  Drainage: none  Odor: none  Wound base: skin is intact, dark maroon color, well adhered dried blister  Lymphangitic streaking? No.  Undermining? No.  Sinus tracts? No.  Probes to bone? No  Subcutaneous crepitus? No  Fluctuance? No     Neurological:  Protective sensation per 5.07 [place] Annita monofilament is reduced bilaterally  Vibratory sensation at the Rt 1st MPJ is present LT 1st MPJ present  Epicritic sensation is intact. Patient is AAOx3, mood is normal.     Orthopedic:  B/L LE are symmetric  ROM of ankle, STJ, 1st MTPJ is limited, MMT 5 out of 5 for B/L LE. No pedal amputations    Constitutional: Pt is a well developed, overweight  73F. Lymphatics: negative tenderness to palpation of neck/axillary/inguinal nodes.     Imaging / Mariely Woodson / Cx / Px:    4/17/18 JEFF/PVR Right 1.0 from Jose Antonio [address] [place] DPM - Kurtis Rodriguez, [address] [place] [address], [address] [place] [address] Sophy Irizarry [address] [place] [address], [address] [facility]  P: [phone]  F: [phone]

## 2018-07-18 ENCOUNTER — HOSPITAL ENCOUNTER (OUTPATIENT)
Dept: WOUND CARE | Age: 73
End: 2018-07-18
Payer: MEDICAID

## 2018-07-24 ENCOUNTER — TELEPHONE (OUTPATIENT)
Dept: WOUND CARE | Age: 73
End: 2018-07-24

## 2018-07-25 ENCOUNTER — HOSPITAL ENCOUNTER (OUTPATIENT)
Dept: WOUND CARE | Age: 73
Discharge: HOME | End: 2018-07-25
Payer: MEDICAID

## 2018-07-25 VITALS
TEMPERATURE: 97.7 F | RESPIRATION RATE: 16 BRPM | SYSTOLIC BLOOD PRESSURE: 140 MMHG | HEART RATE: 66 BPM | DIASTOLIC BLOOD PRESSURE: 78 MMHG

## 2018-07-25 PROCEDURE — 99215 OFFICE O/P EST HI 40 MIN: CPT

## 2018-07-25 PROCEDURE — 74011000250 HC RX REV CODE- 250: Performed by: PODIATRIST

## 2018-07-25 RX ORDER — LIDOCAINE 40 MG/G
CREAM TOPICAL
Status: COMPLETED | OUTPATIENT
Start: 2018-07-25 | End: 2018-07-25

## 2018-07-25 RX ADMIN — LIDOCAINE: 4 CREAM TOPICAL at 10:50

## 2018-07-25 NOTE — WOUND CARE
Podiatric Surgery - [address] - Progress Note  Assessment/Plan:  Right leg venous stasis ulcers (L97.912, I87.331), Left heel DTI (L89.620)    - Pt evaluated and treated. - Will continue with Aquacel Ag+, zinc oxide to periwound, and DSD, as the medial ulceration has healed with this. - Double tubigrips b/l  - Betadine open to air for the left heel  - Recommend offloading right heel with pillows or prevalons at all times. Discussed with  from NH to make sure that she gets an offloading boot. Written on orders to NH.   - F/U 1 week. Subjective:  Pt complains of wounds to both legs. Legs have been swollen and skin opened up on LT. Neg for fever, chills, nausea, vomiting, chest pain, shortness of breath. HPI:  Ms. Yousif Lennon is a 81F who presents with wounds to her right and left legs. She has had these wounds in the past and has been treated with with topical medication. Sometimes her legs swell. She reports living in a nursing home. The wounds can sometimes be painful. History is somewhat limited.      History:  Bilateral lower leg  Allergies   Allergen Reactions    Abilify [Aripiprazole] Unknown (comments)    Clozapine Unknown (comments)    Geodon [Ziprasidone Hcl] Unknown (comments)    Haldol [Haloperidol Lactate] Unknown (comments)    Thorazine [Chlorpromazine] Unknown (comments)     Family History   Problem Relation Age of Onset    Heart Disease Mother       Past Medical History:   Diagnosis Date    Chronic kidney disease     Diabetes ([facility] [address]     Hyperlipidemia     Hypertension     Ill-defined condition     Dementia      Past Surgical History:   Procedure Laterality Date    HX HEENT      bilateral cataract removed    HX ORTHOPAEDIC  3/7/13    RIGHT SHOULDER REVERSE ARTHROPLASTY     Social History   Substance Use Topics    Smoking status: Never Smoker    Smokeless tobacco: Never Used    Alcohol use No       History   Alcohol Use No     History   Drug Use No      History   Smoking Status    Never Smoker   Smokeless Tobacco    Never Used     Current Outpatient Prescriptions   Medication Sig    amLODIPine (NORVASC) 10 mg tablet Take 10 mg by mouth daily.  albuterol-ipratropium (DUO-NEB) 2.5 mg-0.5 mg/3 ml nebu 3 mL by Nebulization route.  albuterol sulfate (PROVENTIL;VENTOLIN) 2.5 mg/0.5 mL nebu nebulizer solution 2.5 mg by Nebulization route every four (4) hours as needed for Wheezing.  lidocaine/benzalkonium chlorid (A+D CRACKED SKIN RELIEF EX) by Apply Externally route.  glipiZIDE (GLUCOTROL) 5 mg tablet Take  by mouth daily.  loperamide (IMMODIUM) 2 mg tablet Take 2 mg by mouth every eight (8) hours as needed for Diarrhea.  lisinopril-hydroCHLOROthiazide (PRINZIDE, ZESTORETIC) 10-12.5 mg per tablet Take 1 Tab by mouth daily.  multivitamin (ONE A DAY) tablet Take 1 Tab by mouth daily.  calcium-vitamin D (OYSTER SHELL CALCIUM-VIT D3) 250-125 mg-unit tablet Take 1 Tab by mouth daily.  acetaminophen (TYLENOL) 325 mg tablet Take 650 mg by mouth every eight (8) hours as needed for Pain.  ascorbic acid, vitamin C, (VITAMIN C) 500 mg tablet Take  by mouth two (2) times a day.  ergocalciferol (ERGOCALCIFEROL) 50,000 unit capsule Take 50,000 Units by mouth.  ondansetron hcl (ZOFRAN) 4 mg tablet Take 4 mg by mouth every six (6) hours as needed for Nausea.  aspirin delayed-release 325 mg tablet Take 1 Tab by mouth daily.  cloNIDine (CATAPRES) 0.1 mg tablet Take 1 Tab by mouth two (2) times a day.  oxyCODONE-acetaminophen (PERCOCET) 5-325 mg per tablet Take 2 Tabs by mouth every four (4) hours as needed for Pain.  furosemide (LASIX) 40 mg tablet 1 po daily    metoprolol (LOPRESSOR) 100 mg tablet Take 1 Tab by mouth two (2) times a day.  pioglitazone (ACTOS) 30 mg tablet Take 1 Tab by mouth daily. No current facility-administered medications for this encounter.          Objective:  Visit Vitals    /78    Pulse 66    Temp 97.7 °F (36.5 °C)    Resp 16       Vascular:  Right DP 1/4; PT 1/4  Left DP 1/4; PT 1/4  Capillary fill time <2 seconds  Edema: bilateral pitting edema to the legs  Skin temperature is cool  Varicosities are absent  Bilateral legs exhibit hemosiderin deposits    Dermatological:  Nails are thickened, elongated, discolored, painful to palpation, 2 mm thick, with subungual debris. Skin is dry and scaly   Skin is normal temp and turgor  No evidence of tinea pedis  There is no maceration of the interspaces of the feet b/l. Hyperkeratotic lesions present at not present. Wound #1  Location: LT leg lateral  Etiology: venous  Size: per RN notes  Margins: rolled  Drainage: serous  Odor: none  Wound Base: granular  Lymphangitic streaking? No.  Undermining? No.  Sinus tracts? No.  Probes to bone? No  Subcutaneous crepitus? No  Fluctuance? No     Wound #2  Location: right leg lateral  Etiology: venous  Size: see nursing notes  Margins: hyperkeratotic  Drainage: serous  Odor: none  Wound base: granular  Lymphangitic streaking? No.  Undermining? No.  Sinus tracts? No.  Probes to bone? No  Subcutaneous crepitus? No  Fluctuance? No     Wound #3  Location: left heel DTI  Etiology: pressure   Size: see nursing notes  Margins: deep tissue injury  Drainage: none  Odor: none  Wound base: skin is intact, dark maroon color, well adhered dried blister  Lymphangitic streaking? No.  Undermining? No.  Sinus tracts? No.  Probes to bone? No  Subcutaneous crepitus? No  Fluctuance? No     Neurological:  Protective sensation per 5.07 [place] Annita monofilament is reduced bilaterally  Vibratory sensation at the Rt 1st MPJ is present LT 1st MPJ present  Epicritic sensation is intact. Patient is AAOx3, mood is normal.     Orthopedic:  B/L LE are symmetric  ROM of ankle, STJ, 1st MTPJ is limited, MMT 5 out of 5 for B/L LE.     No pedal amputations    Constitutional: Pt is a well developed, overweight  73F.    Lymphatics: negative tenderness to palpation of neck/axillary/inguinal nodes. Imaging / Labs / Cx / Px:    4/17/18 JEFF/PVR Right 1.0 from Jose Antonio [address] [place], DPM - Marcheta Banner K. Lorie Gilford, 1 [facility], [address] [place] [address] Juan C 38 [facility], [place], [address] [place] [address]  P: [phone]  F: [phone]

## 2018-07-25 NOTE — WOUND CARE
07/25/18 1038 Wound Leg Medial;Right Date First Assessed/Time First Assessed: 06/11/18 1019   POA: Yes  Wound Type: Venous  Location: Leg  Orientation: Medial;Right DRESSING TYPE Compression Wrap/Venous Stasis Non-Pressure Injury Partial thickness (epider/derm) Wound Length (cm) 0.6 cm Wound Width (cm) 0.6 cm Wound Depth (cm) 0.1 Wound Surface area (cm^2) 0.36 cm^2 Condition of Base Pink Drainage Amount  Small Drainage Color Serosanguinous Wound Odor None Periwound Skin Condition Intact Wound Leg Right;Lateral;Posterior Date First Assessed/Time First Assessed: 06/11/18 1026   POA: Yes  Wound Type: Venous  Location: Leg  Orientation: Right;Lateral;Posterior DRESSING TYPE Compression Wrap/Venous Stasis Non-Pressure Injury Full thickness (subcut/muscle) Wound Length (cm) 6 cm Wound Width (cm) 6.5 cm Wound Depth (cm) 0.4 Wound Surface area (cm^2) 39 cm^2 Condition of Base Pink Condition of Edges Open Drainage Amount  Large Drainage Color Purulent Wound Odor None Periwound Skin Condition Intact Cleansing and Cleansing Agents  Soap and water Wound Heel Left Date First Assessed/Time First Assessed: 06/25/18 1018   POA: Yes  Wound Type: Pressure injury  Location: Heel  Orientation: Left Wound Length (cm) 5.8 cm Wound Width (cm) 4.7 cm Wound Depth (cm) 0.1 Wound Surface area (cm^2) 27.26 cm^2 Condition of Base Pink Condition of Edges Rolled/curled Drainage Amount  Large Drainage Color Serosanguinous Wound Odor Foul Wound Leg Lower Lateral;Left Date First Assessed/Time First Assessed: 07/25/18 1046   POA: Yes  Wound Type: Venous  Location: Leg Lower  Orientation: Lateral;Left Wound Length (cm) 9.5 cm Wound Width (cm) 1.7 cm Wound Depth (cm) 0.1 Wound Surface area (cm^2) 16.15 cm^2 Condition of Base [place];Slough Drainage Amount  Large Drainage Color Serosanguinous Wound Odor None  
 
 
 07/25/18 1038 Wound Leg Medial;Right Date First Assessed/Time First Assessed: 06/11/18 1019   POA: Yes  Wound Type: Venous  Location: Leg  Orientation: Medial;Right DRESSING TYPE Compression Wrap/Venous Stasis Non-Pressure Injury Partial thickness (epider/derm) Wound Length (cm) 0.6 cm Wound Width (cm) 0.6 cm Wound Depth (cm) 0.1 Wound Surface area (cm^2) 0.36 cm^2 Condition of Base Pink Drainage Amount  Small Drainage Color Serosanguinous Wound Odor None Periwound Skin Condition Intact Wound Leg Right;Lateral;Posterior Date First Assessed/Time First Assessed: 06/11/18 1026   POA: Yes  Wound Type: Venous  Location: Leg  Orientation: Right;Lateral;Posterior DRESSING TYPE Compression Wrap/Venous Stasis Non-Pressure Injury Full thickness (subcut/muscle) Wound Length (cm) 6 cm Wound Width (cm) 6.5 cm Wound Depth (cm) 0.4 Wound Surface area (cm^2) 39 cm^2 Condition of Base Pink Condition of Edges Open Drainage Amount  Large Drainage Color Purulent Wound Odor None Periwound Skin Condition Intact Cleansing and Cleansing Agents  Soap and water Wound Heel Left Date First Assessed/Time First Assessed: 06/25/18 1018   POA: Yes  Wound Type: Pressure injury  Location: Heel  Orientation: Left Wound Length (cm) 5.8 cm Wound Width (cm) 4.7 cm Wound Depth (cm) 0.1 Wound Surface area (cm^2) 27.26 cm^2 Condition of Base Pink Condition of Edges Rolled/curled Drainage Amount  Large Drainage Color Serosanguinous Wound Odor Foul Wound Leg Lower Lateral;Left Date First Assessed/Time First Assessed: 07/25/18 1046   POA: Yes  Wound Type: Venous  Location: Leg Lower  Orientation: Lateral;Left Wound Length (cm) 9.5 cm Wound Width (cm) 1.7 cm Wound Depth (cm) 0.1 Wound Surface area (cm^2) 16.15 cm^2 Condition of Base [place];Slough Drainage Amount  Large Drainage Color Serosanguinous Wound Odor None  
 
 
 07/25/18 1038 Wound Leg Medial;Right Date First Assessed/Time First Assessed: 06/11/18 1019   POA: Yes  Wound Type: Venous  Location: Leg  Orientation: Medial;Right DRESSING TYPE Compression Wrap/Venous Stasis Non-Pressure Injury Partial thickness (epider/derm) Wound Length (cm) 0.6 cm Wound Width (cm) 0.6 cm Wound Depth (cm) 0.1 Wound Surface area (cm^2) 0.36 cm^2 Condition of Base Pink Drainage Amount  Small Drainage Color Serosanguinous Wound Odor None Periwound Skin Condition Intact Wound Leg Right;Lateral;Posterior Date First Assessed/Time First Assessed: 06/11/18 1026   POA: Yes  Wound Type: Venous  Location: Leg  Orientation: Right;Lateral;Posterior DRESSING TYPE Compression Wrap/Venous Stasis Non-Pressure Injury Full thickness (subcut/muscle) Wound Length (cm) 6 cm Wound Width (cm) 6.5 cm Wound Depth (cm) 0.4 Wound Surface area (cm^2) 39 cm^2 Condition of Base Pink Condition of Edges Open Drainage Amount  Large Drainage Color Purulent Wound Odor None Periwound Skin Condition Intact Cleansing and Cleansing Agents  Soap and water Wound Heel Left Date First Assessed/Time First Assessed: 06/25/18 1018   POA: Yes  Wound Type: Pressure injury  Location: Heel  Orientation: Left Wound Length (cm) 5.8 cm Wound Width (cm) 4.7 cm Wound Depth (cm) 0.1 Wound Surface area (cm^2) 27.26 cm^2 Condition of Base Pink Condition of Edges Rolled/curled Drainage Amount  Large Drainage Color Serosanguinous Wound Odor Foul Wound Leg Lower Lateral;Left Date First Assessed/Time First Assessed: 07/25/18 1046   POA: Yes  Wound Type: Venous  Location: Leg Lower  Orientation: Lateral;Left Wound Length (cm) 9.5 cm Wound Width (cm) 1.7 cm Wound Depth (cm) 0.1 Wound Surface area (cm^2) 16.15 cm^2 Condition of Base [place];Slough Drainage Amount  Large Drainage Color Serosanguinous Wound Odor None

## 2018-08-01 ENCOUNTER — HOSPITAL ENCOUNTER (OUTPATIENT)
Dept: WOUND CARE | Age: 73
Discharge: HOME | End: 2018-08-01
Payer: MEDICAID

## 2018-08-01 VITALS
RESPIRATION RATE: 20 BRPM | SYSTOLIC BLOOD PRESSURE: 154 MMHG | HEART RATE: 87 BPM | DIASTOLIC BLOOD PRESSURE: 83 MMHG | TEMPERATURE: 97.5 F

## 2018-08-01 PROCEDURE — 99214 OFFICE O/P EST MOD 30 MIN: CPT

## 2018-08-01 NOTE — WOUND CARE
08/01/18 1016 Wound Leg Lower Lateral;Left Date First Assessed/Time First Assessed: 07/25/18 1046   POA: Yes  Wound Type: Venous  Location: Leg Lower  Orientation: Lateral;Left DRESSING STATUS Clean, dry, and intact DRESSING TYPE Aquacel;Gauze Wound Length (cm) 0 cm Wound Width (cm) 0 cm Wound Depth (cm) 0 Wound Surface area (cm^2) 0 cm^2 Condition of Base Pink Epithelialization (%) 100 Drainage Amount  None Cleansing and Cleansing Agents  Normal saline Wound Heel Left Date First Assessed/Time First Assessed: 06/25/18 1018   POA: Yes  Wound Type: Pressure injury  Location: Heel  Orientation: Left DRESSING STATUS Clean, dry, and intact DRESSING TYPE Aquacel;Gauze Wound Length (cm) 3.5 cm Wound Width (cm) 4.5 cm Wound Depth (cm) 0.1 Wound Surface area (cm^2) 15.75 cm^2 Condition of Base [place];Pink Condition of Edges Open Tissue Type Percent Maroon/Purple 95 % Tissue Type Percent Red 5 Drainage Amount  Moderate Drainage Color Serosanguinous Wound Odor Foul Periwound Skin Condition Erythema, blanchable Wound Leg Medial;Right Date First Assessed/Time First Assessed: 06/11/18 1019   POA: Yes  Wound Type: Venous  Location: Leg  Orientation: Medial;Right DRESSING STATUS Dry; Intact DRESSING TYPE Aquacel;Gauze Wound Length (cm) 0.5 cm Wound Width (cm) 0.6 cm Wound Depth (cm) 0.1 Wound Surface area (cm^2) 0.3 cm^2 Condition of Base Pink Tissue Type Percent Pink 100 Drainage Amount  None Periwound Skin Condition Intact Cleansing and Cleansing Agents  Normal saline Wound Leg Right;Lateral;Posterior Date First Assessed/Time First Assessed: 06/11/18 1026   POA: Yes  Wound Type: Venous  Location: Leg  Orientation: Right;Lateral;Posterior DRESSING STATUS Dry; Intact; New drainage; Old drainage DRESSING TYPE Aquacel;Gauze Wound Length (cm) 1.9 cm Wound Width (cm) 2.2 cm Wound Depth (cm) 0.2 Wound Surface area (cm^2) 4.18 cm^2 Condition of Base [place];Pink;Slough Condition of Edges Open Tissue Type Percent Red 95 Tissue Type Percent Yellow 5 Drainage Amount  Moderate Drainage Color Serosanguinous Wound Odor Strong Periwound Skin Condition Erythema, blanchable Cleansing and Cleansing Agents  Normal saline

## 2018-08-01 NOTE — WOUND CARE
Podiatric Surgery - [address] - Progress Note Assessment/Plan: 
Right leg venous stasis ulcers (L97.912, I87.331), Left heel DTI (L89.620) - Pt evaluated and treated. - Will continue with Aquacel Ag+, zinc oxide to periwound, and DSD, as the medial ulceration has healed with this, LT heel and RT lateral leg are both smaller, minimal changes to the small medial ulceration. 
- Double tubigrips b/l - Betadine open to air for the left heel - Recommend offloading right heel with pillows or prevalons at all times. Discussed with  from NH to make sure that she gets an offloading boot. Written on orders to NH.  
- F/U 1 week. Subjective: 
Pt complains of wounds to both legs. Legs have been swollen and skin opened up on LT. Neg for fever, chills, nausea, vomiting, chest pain, shortness of breath. HPI: 
Ms. Tomi Arroyo is a 81F who presents with wounds to her right and left legs. She has had these wounds in the past and has been treated with with topical medication. Sometimes her legs swell. She reports living in a nursing home. The wounds can sometimes be painful. History is somewhat limited. History: 
Bilateral lower leg Allergies Allergen Reactions  Abilify [Aripiprazole] Unknown (comments)  Clozapine Unknown (comments)  Geodon [Ziprasidone Hcl] Unknown (comments)  Haldol [Haloperidol Lactate] Unknown (comments)  Thorazine [Chlorpromazine] Unknown (comments) Family History Problem Relation Age of Onset  Heart Disease Mother Past Medical History:  
Diagnosis Date  Chronic kidney disease  Diabetes ([facility] [address]  Hyperlipidemia  Hypertension  Ill-defined condition Dementia Past Surgical History:  
Procedure Laterality Date  HX HEENT    
 bilateral cataract removed  HX ORTHOPAEDIC  3/7/13 RIGHT SHOULDER REVERSE ARTHROPLASTY Social History Substance Use Topics  Smoking status: Never Smoker  Smokeless tobacco: Never Used  Alcohol use No  
   
History Alcohol Use No  
 
History Drug Use No  
  
History Smoking Status  Never Smoker Smokeless Tobacco  
 Never Used Current Outpatient Prescriptions Medication Sig  
 amLODIPine (NORVASC) 10 mg tablet Take 10 mg by mouth daily.  albuterol-ipratropium (DUO-NEB) 2.5 mg-0.5 mg/3 ml nebu 3 mL by Nebulization route.  albuterol sulfate (PROVENTIL;VENTOLIN) 2.5 mg/0.5 mL nebu nebulizer solution 2.5 mg by Nebulization route every four (4) hours as needed for Wheezing.  lidocaine/benzalkonium chlorid (A+D CRACKED SKIN RELIEF EX) by Apply Externally route.  glipiZIDE (GLUCOTROL) 5 mg tablet Take  by mouth daily.  loperamide (IMMODIUM) 2 mg tablet Take 2 mg by mouth every eight (8) hours as needed for Diarrhea.  lisinopril-hydroCHLOROthiazide (PRINZIDE, ZESTORETIC) 10-12.5 mg per tablet Take 1 Tab by mouth daily.  multivitamin (ONE A DAY) tablet Take 1 Tab by mouth daily.  calcium-vitamin D (OYSTER SHELL CALCIUM-VIT D3) 250-125 mg-unit tablet Take 1 Tab by mouth daily.  acetaminophen (TYLENOL) 325 mg tablet Take 650 mg by mouth every eight (8) hours as needed for Pain.  ascorbic acid, vitamin C, (VITAMIN C) 500 mg tablet Take  by mouth two (2) times a day.  ergocalciferol (ERGOCALCIFEROL) 50,000 unit capsule Take 50,000 Units by mouth.  ondansetron hcl (ZOFRAN) 4 mg tablet Take 4 mg by mouth every six (6) hours as needed for Nausea.  aspirin delayed-release 325 mg tablet Take 1 Tab by mouth daily.  cloNIDine (CATAPRES) 0.1 mg tablet Take 1 Tab by mouth two (2) times a day.  oxyCODONE-acetaminophen (PERCOCET) 5-325 mg per tablet Take 2 Tabs by mouth every four (4) hours as needed for Pain.  furosemide (LASIX) 40 mg tablet 1 po daily  metoprolol (LOPRESSOR) 100 mg tablet Take 1 Tab by mouth two (2) times a day.  pioglitazone (ACTOS) 30 mg tablet Take 1 Tab by mouth daily.   
 
No current facility-administered medications for this encounter. Objective: 
Visit Vitals  /83 (BP 1 Location: Right arm, BP Patient Position: Sitting)  Pulse 87  Temp 97.5 °F (36.4 °C)  Resp 20 Vascular: 
Right DP 1/4; PT 1/4 Left DP 1/4; PT 1/4 Capillary fill time <2 seconds Edema: bilateral pitting edema to the legs Skin temperature is cool Varicosities are absent Bilateral legs exhibit hemosiderin deposits Dermatological: 
Nails are thickened, elongated, discolored, painful to palpation, 2 mm thick, with subungual debris. Skin is dry and scaly Skin is normal temp and turgor No evidence of tinea pedis There is no maceration of the interspaces of the feet b/l. Hyperkeratotic lesions present at not present. Wound #1 Location: LT leg lateral 
Etiology: venous Size: per RN notes Margins: rolled Drainage: serous Odor: none Wound Base: granular Lymphangitic streaking? No. 
Undermining? No. 
Sinus tracts? No. 
Probes to bone? No 
Subcutaneous crepitus? No 
Fluctuance? No  
 
Wound #2 Location: right leg lateral 
Etiology: venous Size: see nursing notes Margins: hyperkeratotic Drainage: serous Odor: none Wound base: granular Lymphangitic streaking? No. 
Undermining? No. 
Sinus tracts? No. 
Probes to bone? No 
Subcutaneous crepitus? No 
Fluctuance? No  
 
Wound #3 Location: left heel DTI Etiology: pressure Size: see nursing notes Margins: deep tissue injury Drainage: none Odor: none Wound base: skin is intact, dark maroon color, well adhered dried blister Lymphangitic streaking? No. 
Undermining? No. 
Sinus tracts? No. 
Probes to bone? No 
Subcutaneous crepitus? No 
Fluctuance? No  
 
Neurological: 
Protective sensation per 5.07 [place] monofilament is reduced bilaterally Vibratory sensation at the Rt 1st MPJ is present LT 1st MPJ present Epicritic sensation is intact. Patient is AAOx3, mood is normal.  
 
Orthopedic: B/L LE are symmetric ROM of ankle, STJ, 1st MTPJ is limited, MMT 5 out of 5 for B/L LE. No pedal amputations Constitutional: Pt is a well developed, overweight  73F. Lymphatics: negative tenderness to palpation of neck/axillary/inguinal nodes. Imaging / Labs / Cx / Px: 
 
4/17/18 JEFF/PVR Right 1.0 from [facility] IRAIDA Martinez, 1 [facility], [address] [place] [address], [id number] [facility] P:   F: [phone]

## 2018-08-08 ENCOUNTER — HOSPITAL ENCOUNTER (OUTPATIENT)
Dept: WOUND CARE | Age: 73
Discharge: HOME | End: 2018-08-08
Payer: MEDICAID

## 2018-08-08 VITALS
SYSTOLIC BLOOD PRESSURE: 146 MMHG | RESPIRATION RATE: 18 BRPM | TEMPERATURE: 98.1 F | DIASTOLIC BLOOD PRESSURE: 79 MMHG | HEART RATE: 81 BPM

## 2018-08-08 PROCEDURE — 74011000250 HC RX REV CODE- 250: Performed by: PODIATRIST

## 2018-08-08 RX ORDER — LIDOCAINE HYDROCHLORIDE 20 MG/ML
JELLY TOPICAL AS NEEDED
Status: DISCONTINUED | OUTPATIENT
Start: 2018-08-08 | End: 2018-08-12 | Stop reason: HOSPADM

## 2018-08-08 RX ADMIN — LIDOCAINE HYDROCHLORIDE: 20 JELLY TOPICAL at 10:43

## 2018-08-08 NOTE — WOUND CARE
08/08/18 1030   Wound Heel Left   Date First Assessed/Time First Assessed: 06/25/18 1018   POA: Yes  Wound Type: Pressure injury  Location: Heel  Orientation: Left   DRESSING STATUS Old drainage;Removed   DRESSING TYPE Aquacel;Gauze;Gauze wrap (tex)   Pressure Injury Unstageable   Wound Length (cm) 4 cm   Wound Width (cm) 4.4 cm   Wound Depth (cm) 0.1   Wound Surface area (cm^2) 17.6 cm^2   Change in Wound Size % 32.95   Condition of Base [place]char   Condition of Edges Open   Tissue Type Black; Pink   Drainage Amount  Large   Drainage Color Purulent   Wound Odor Strong;Pungent   Periwound Skin Condition Edematous   Cleansing and Cleansing Agents  Normal saline   Wound Leg Medial;Right   Date First Assessed/Time First Assessed: 06/11/18 1019   POA: Yes  Wound Type: Venous  Location: Leg  Orientation: Medial;Right   DRESSING STATUS Old drainage;Removed   DRESSING TYPE Aquacel;Gauze;Gauze wrap (tex)   Non-Pressure Injury Partial thickness (epider/derm)   Wound Length (cm) 1.4 cm   Wound Width (cm) 1.4 cm   Wound Depth (cm) 0.1   Wound Surface area (cm^2) 1.96 cm^2   Change in Wound Size % -154.55   Condition of Base Hypergranulation   Condition of Edges Open   Tissue Type Pink   Drainage Amount  Moderate   Drainage Color Serosanguinous   Wound Odor None   Periwound Skin Condition Intact   Cleansing and Cleansing Agents  Normal saline   Wound Leg Right;Lateral;Posterior   Date First Assessed/Time First Assessed: 06/11/18 1026   POA: Yes  Wound Type: Venous  Location: Leg  Orientation: Right;Lateral;Posterior   DRESSING STATUS Old drainage;Removed   DRESSING TYPE Aquacel;Gauze;Gauze wrap (tex)   Non-Pressure Injury Full thickness (subcut/muscle)   Wound Length (cm) 1.6 cm   Wound Width (cm) 2.5 cm   Wound Depth (cm) 0.1   Wound Surface area (cm^2) 4 cm^2   Change in Wound Size % 66.39   Condition of Base [place];Pink   Condition of Edges Open   Tissue Type Red;Pink   Drainage Amount  Moderate Drainage Color Tan   Wound Odor Mild   Periwound Skin Condition Edematous   Cleansing and Cleansing Agents  Normal saline                   /79 (BP 1 Location: Right arm, BP Patient Position: At rest)  Pulse 81  Temp 98.1 °F (36.7 °C)  Resp 18

## 2018-08-22 ENCOUNTER — HOSPITAL ENCOUNTER (OUTPATIENT)
Dept: WOUND CARE | Age: 73
Discharge: HOME | End: 2018-08-22
Payer: MEDICAID

## 2018-08-22 VITALS
RESPIRATION RATE: 16 BRPM | SYSTOLIC BLOOD PRESSURE: 155 MMHG | TEMPERATURE: 97.5 F | HEART RATE: 54 BPM | DIASTOLIC BLOOD PRESSURE: 83 MMHG

## 2018-08-22 PROCEDURE — 11042 DBRDMT SUBQ TIS 1ST 20SQCM/<: CPT

## 2018-08-22 PROCEDURE — 74011000250 HC RX REV CODE- 250: Performed by: PODIATRIST

## 2018-08-22 RX ORDER — LIDOCAINE HYDROCHLORIDE 20 MG/ML
JELLY TOPICAL AS NEEDED
Status: DISCONTINUED | OUTPATIENT
Start: 2018-08-22 | End: 2018-08-26 | Stop reason: HOSPADM

## 2018-08-22 RX ADMIN — LIDOCAINE HYDROCHLORIDE: 20 JELLY TOPICAL at 11:41

## 2018-08-22 NOTE — WOUND CARE
08/22/18 1133   Wound Heel Left   Date First Assessed/Time First Assessed: 06/25/18 1018   POA: Yes  Wound Type: Pressure injury  Location: Heel  Orientation: Left   DRESSING STATUS Breakthrough drainage   Pressure Injury Unstageable   Wound Length (cm) 3.7 cm   Wound Width (cm) 4.5 cm   Wound Depth (cm) 0.1   Wound Surface area (cm^2) 16.65 cm^2   Condition of Base [place];Pink   Drainage Amount  Large   Drainage Color Serous   Wound Odor Mild   Periwound Skin Condition Edematous   Cleansing and Cleansing Agents  Normal saline; Soap and water   Wound Leg Medial;Right   Date First Assessed/Time First Assessed: 06/11/18 1019   POA: Yes  Wound Type: Venous  Location: Leg  Orientation: Medial;Right   DRESSING STATUS Clean, dry, and intact   Wound Length (cm) 0.1 cm   Wound Width (cm) 0.1 cm   Wound Depth (cm) 0.1   Wound Surface area (cm^2) 0.01 cm^2   Condition of Base Epithelializing   Wound Leg Right;Lateral;Posterior   Date First Assessed/Time First Assessed: 06/11/18 1026   POA: Yes  Wound Type: Venous  Location: Leg  Orientation: Right;Lateral;Posterior   DRESSING STATUS Breakthrough drainage   Wound Length (cm) 1 cm   Wound Width (cm) 1.5 cm   Wound Depth (cm) 0.1   Wound Surface area (cm^2) 1.5 cm^2   Condition of Base Epithelializing   Drainage Amount  Moderate   Drainage Color Serous   Wound Odor Mild   Periwound Skin Condition Edematous   Cleansing and Cleansing Agents  Soap and water;Normal saline

## 2018-08-22 NOTE — WOUND CARE
Podiatric Surgery - [address] - Progress Note  Assessment/Plan:  Right leg venous stasis ulcers (L97.912, I87.331), Left heel DTI (L89.620)    - Pt evaluated and treated. - RT leg ulcerations doing better, lateral smaller, medial mostly healed. No odor. LT heel is somewhat smaller, still has odor, debrided soft necrotic tissue as noted below. Will continue with GV / BW2D.  - Offload heels with pillows / prevalons all times. Ordered with SNF by Dr. Zoila Edge. - F/U 2 weeks. Subjective:  Pt complains of wounds to both legs. Neg for fever, chills, nausea, vomiting, chest pain, shortness of breath. HPI:  Ms. Bella Lane is a 81F who presents with wounds to her right and left legs. She has had these wounds in the past and has been treated with with topical medication. Sometimes her legs swell. She reports living in a nursing home. The wounds can sometimes be painful. History is somewhat limited.      History:  Bilateral lower leg  Allergies   Allergen Reactions    Abilify [Aripiprazole] Unknown (comments)    Clozapine Unknown (comments)    Geodon [Ziprasidone Hcl] Unknown (comments)    Haldol [Haloperidol Lactate] Unknown (comments)    Thorazine [Chlorpromazine] Unknown (comments)     Family History   Problem Relation Age of Onset    Heart Disease Mother       Past Medical History:   Diagnosis Date    Chronic kidney disease     Diabetes ([facility] [address]     Hyperlipidemia     Hypertension     Ill-defined condition     Dementia      Past Surgical History:   Procedure Laterality Date    HX HEENT      bilateral cataract removed    HX ORTHOPAEDIC  3/7/13    RIGHT SHOULDER REVERSE ARTHROPLASTY     Social History   Substance Use Topics    Smoking status: Never Smoker    Smokeless tobacco: Never Used    Alcohol use No       History   Alcohol Use No     History   Drug Use No      History   Smoking Status    Never Smoker   Smokeless Tobacco    Never Used     Current Outpatient Prescriptions   Medication Sig    amLODIPine (NORVASC) 10 mg tablet Take 10 mg by mouth daily.  albuterol-ipratropium (DUO-NEB) 2.5 mg-0.5 mg/3 ml nebu 3 mL by Nebulization route.  albuterol sulfate (PROVENTIL;VENTOLIN) 2.5 mg/0.5 mL nebu nebulizer solution 2.5 mg by Nebulization route every four (4) hours as needed for Wheezing.  lidocaine/benzalkonium chlorid (A+D CRACKED SKIN RELIEF EX) by Apply Externally route.  glipiZIDE (GLUCOTROL) 5 mg tablet Take  by mouth daily.  loperamide (IMMODIUM) 2 mg tablet Take 2 mg by mouth every eight (8) hours as needed for Diarrhea.  lisinopril-hydroCHLOROthiazide (PRINZIDE, ZESTORETIC) 10-12.5 mg per tablet Take 1 Tab by mouth daily.  multivitamin (ONE A DAY) tablet Take 1 Tab by mouth daily.  calcium-vitamin D (OYSTER SHELL CALCIUM-VIT D3) 250-125 mg-unit tablet Take 1 Tab by mouth daily.  acetaminophen (TYLENOL) 325 mg tablet Take 650 mg by mouth every eight (8) hours as needed for Pain.  ascorbic acid, vitamin C, (VITAMIN C) 500 mg tablet Take  by mouth two (2) times a day.  ergocalciferol (ERGOCALCIFEROL) 50,000 unit capsule Take 50,000 Units by mouth.  ondansetron hcl (ZOFRAN) 4 mg tablet Take 4 mg by mouth every six (6) hours as needed for Nausea.  aspirin delayed-release 325 mg tablet Take 1 Tab by mouth daily.  cloNIDine (CATAPRES) 0.1 mg tablet Take 1 Tab by mouth two (2) times a day.  oxyCODONE-acetaminophen (PERCOCET) 5-325 mg per tablet Take 2 Tabs by mouth every four (4) hours as needed for Pain.  furosemide (LASIX) 40 mg tablet 1 po daily    metoprolol (LOPRESSOR) 100 mg tablet Take 1 Tab by mouth two (2) times a day.  pioglitazone (ACTOS) 30 mg tablet Take 1 Tab by mouth daily.      Current Facility-Administered Medications   Medication Dose Route Frequency    lidocaine (XYLOCAINE) 2 % jelly   Topical PRN        Objective:  Visit Vitals    /83    Pulse (!) 54    Temp 97.5 °F (36.4 °C)    Resp 16 Vascular:  Right DP 1/4; PT 1/4  Left DP 1/4; PT 1/4  Capillary fill time <2 seconds  Edema: bilateral pitting edema to the legs  Skin temperature is cool  Varicosities are absent  Bilateral legs exhibit hemosiderin deposits    Dermatological:  Nails are thickened, elongated, discolored, painful to palpation, 2 mm thick, with subungual debris. Skin is dry and scaly   Skin is normal temp and turgor  No evidence of tinea pedis  There is no maceration of the interspaces of the feet b/l. Hyperkeratotic lesions present at not present. Wound #1  Location: LT leg posterior  Etiology: venous  Size: per RN notes  Margins: rolled  Drainage: serous  Odor: none  Wound Base: granular  Lymphangitic streaking? No.  Undermining? No.  Sinus tracts? No.  Probes to bone? No  Subcutaneous crepitus? No  Fluctuance? No     Wound #2  Location: right leg lateral  Etiology: venous  Size: see nursing notes  Margins: hyperkeratotic  Drainage: serous  Odor: none  Wound base: granular  Lymphangitic streaking? No.  Undermining? No.  Sinus tracts? No.  Probes to bone? No  Subcutaneous crepitus? No  Fluctuance? No     Wound #3  Location: left heel DTI  Etiology: pressure   Size: see nursing notes  Margins: rolled / macerated  Drainage: none  Odor: moderate  Wound base: soft wet necrotic skin and fibrotic tissue, granulation tissue at margin  Lymphangitic streaking? No.  Undermining? No.  Sinus tracts? No.  Probes to bone? No  Subcutaneous crepitus? No  Fluctuance? No     Neurological:  Protective sensation per 5.07 [place] Annita monofilament is reduced bilaterally  Vibratory sensation at the Rt 1st MPJ is present LT 1st MPJ present  Epicritic sensation is intact. Patient is AAOx3, mood is normal.     Orthopedic:  B/L LE are symmetric  ROM of ankle, STJ, 1st MTPJ is limited, MMT 5 out of 5 for B/L LE. No pedal amputations    Constitutional: Pt is a well developed, overweight  73F.     Lymphatics: negative tenderness to palpation of neck/axillary/inguinal nodes. Imaging / Labs / Cx / Px:    4/17/18 JEFF/PVR Right 1.0 from [place]    Procedure Note:  Excisional debridement through level of fat. Location / Ulcer: LT heel  Indication: to remove non-viable tissue from wound bed. Consent in chart. Anesthesia: topical lidocaine  Instrument: scissors / pickup  Residual necrosis: present / scored  Bleeding: minimal  Hemostasis: pressure  Pre-Procedure Pain: 0  Post-Procedure Pain: 0  Area debrided < 20 cm sq. Pre-Debridement measurements: see nursing notes  Post-Debridement measurements: see nursing notes  This is part of a series of staged procedures in an attempt at limb salvage. [facility]  IRAIDA Harden, [address] [facility], [address] [place] [address]. Juan C [address] [place] [address], [address] [facility]  P: [phone]  F: [phone]

## 2018-09-05 ENCOUNTER — HOSPITAL ENCOUNTER (OUTPATIENT)
Dept: WOUND CARE | Age: 73
Discharge: HOME | End: 2018-09-05
Payer: MEDICAID

## 2018-09-05 VITALS
HEART RATE: 65 BPM | DIASTOLIC BLOOD PRESSURE: 71 MMHG | TEMPERATURE: 97.6 F | RESPIRATION RATE: 17 BRPM | SYSTOLIC BLOOD PRESSURE: 136 MMHG

## 2018-09-05 PROCEDURE — 11042 DBRDMT SUBQ TIS 1ST 20SQCM/<: CPT

## 2018-09-05 PROCEDURE — 74011000250 HC RX REV CODE- 250: Performed by: PODIATRIST

## 2018-09-05 RX ORDER — LIDOCAINE HYDROCHLORIDE 20 MG/ML
JELLY TOPICAL AS NEEDED
Status: DISCONTINUED | OUTPATIENT
Start: 2018-09-05 | End: 2018-09-09 | Stop reason: HOSPADM

## 2018-09-05 RX ADMIN — LIDOCAINE HYDROCHLORIDE: 20 JELLY TOPICAL at 11:01

## 2018-09-05 NOTE — WOUND CARE
Podiatric Surgery - [address] - Progress Note Assessment/Plan: 
Right leg venous stasis ulcers (L97.912, I87.331), Left heel DTI (L89.620) - Pt evaluated and treated. - RT outer leg and posterior LT leg ulcerations stagnant, medial is mostly healed, LT heel stagnant, debrided as noted below and will try medihoney gel on this, and betadine / GV to the RT and LT leg ulcers. - RT 4th nail debrided. - Offload heels with pillows / prevalons all times. Ordered with SNF by Dr. Eloisa Talavera. - F/U 2 weeks. Subjective: 
Pt complains of wounds to both legs. Neg for fever, chills, nausea, vomiting, chest pain, shortness of breath. Notes at some point bumped RT foot and the RT 4th nail came loose, which is painful. HPI: 
Ms. Jeancarlos Grace is a 81F who presents with wounds to her right and left legs. She has had these wounds in the past and has been treated with with topical medication. Sometimes her legs swell. She reports living in a nursing home. The wounds can sometimes be painful. History is somewhat limited. History: 
Bilateral lower leg Allergies Allergen Reactions  Abilify [Aripiprazole] Unknown (comments)  Clozapine Unknown (comments)  Geodon [Ziprasidone Hcl] Unknown (comments)  Haldol [Haloperidol Lactate] Unknown (comments)  Thorazine [Chlorpromazine] Unknown (comments) Family History Problem Relation Age of Onset  Heart Disease Mother Past Medical History:  
Diagnosis Date  Chronic kidney disease  Diabetes ([facility] [address]  Hyperlipidemia  Hypertension  Ill-defined condition Dementia Past Surgical History:  
Procedure Laterality Date  HX HEENT    
 bilateral cataract removed  HX ORTHOPAEDIC  3/7/13 RIGHT SHOULDER REVERSE ARTHROPLASTY Social History Substance Use Topics  Smoking status: Never Smoker  Smokeless tobacco: Never Used  Alcohol use No  
   
History Alcohol Use No  
 
History Drug Use No  
  
 History Smoking Status  Never Smoker Smokeless Tobacco  
 Never Used Current Outpatient Prescriptions Medication Sig  
 amLODIPine (NORVASC) 10 mg tablet Take 10 mg by mouth daily.  albuterol-ipratropium (DUO-NEB) 2.5 mg-0.5 mg/3 ml nebu 3 mL by Nebulization route.  albuterol sulfate (PROVENTIL;VENTOLIN) 2.5 mg/0.5 mL nebu nebulizer solution 2.5 mg by Nebulization route every four (4) hours as needed for Wheezing.  lidocaine/benzalkonium chlorid (A+D CRACKED SKIN RELIEF EX) by Apply Externally route.  glipiZIDE (GLUCOTROL) 5 mg tablet Take  by mouth daily.  loperamide (IMMODIUM) 2 mg tablet Take 2 mg by mouth every eight (8) hours as needed for Diarrhea.  lisinopril-hydroCHLOROthiazide (PRINZIDE, ZESTORETIC) 10-12.5 mg per tablet Take 1 Tab by mouth daily.  multivitamin (ONE A DAY) tablet Take 1 Tab by mouth daily.  calcium-vitamin D (OYSTER SHELL CALCIUM-VIT D3) 250-125 mg-unit tablet Take 1 Tab by mouth daily.  acetaminophen (TYLENOL) 325 mg tablet Take 650 mg by mouth every eight (8) hours as needed for Pain.  ascorbic acid, vitamin C, (VITAMIN C) 500 mg tablet Take  by mouth two (2) times a day.  ergocalciferol (ERGOCALCIFEROL) 50,000 unit capsule Take 50,000 Units by mouth.  ondansetron hcl (ZOFRAN) 4 mg tablet Take 4 mg by mouth every six (6) hours as needed for Nausea.  aspirin delayed-release 325 mg tablet Take 1 Tab by mouth daily.  cloNIDine (CATAPRES) 0.1 mg tablet Take 1 Tab by mouth two (2) times a day.  oxyCODONE-acetaminophen (PERCOCET) 5-325 mg per tablet Take 2 Tabs by mouth every four (4) hours as needed for Pain.  furosemide (LASIX) 40 mg tablet 1 po daily  metoprolol (LOPRESSOR) 100 mg tablet Take 1 Tab by mouth two (2) times a day.  pioglitazone (ACTOS) 30 mg tablet Take 1 Tab by mouth daily. Current Facility-Administered Medications Medication Dose Route Frequency  lidocaine (XYLOCAINE) 2 % jelly   Topical PRN  
  
 
 Objective: 
Visit Vitals  /71 (BP 1 Location: Right arm, BP Patient Position: At rest)  Pulse 65  Temp 97.6 °F (36.4 °C)  Resp 17 Vascular: 
Right DP 1/4; PT 1/4 Left DP 1/4; PT 1/4 Capillary fill time <2 seconds Edema: bilateral pitting edema to the legs Skin temperature is cool Varicosities are absent Bilateral legs exhibit hemosiderin deposits Dermatological: 
Nails are thickened, elongated, discolored, painful to palpation, 2 mm thick, with subungual debris. RT 4th toenail is loose with some sanguinous drainage, no associated erythema, edema, malodor, minimal sanguinous drainage. Skin is dry and scaly No evidence of tinea pedis There is no maceration of the interspaces of the feet b/l. Hyperkeratotic lesions not present. Wound #1 Location: LT leg posterior Etiology: venous Size: per RN notes Margins: rolled Drainage: serous Odor: none Wound Base: granular Lymphangitic streaking? No. 
Undermining? No. 
Sinus tracts? No. 
Probes to bone? No 
Subcutaneous crepitus? No 
Fluctuance? No  
 
Wound #2 Location: right leg lateral 
Etiology: venous Size: see nursing notes Margins: hyperkeratotic Drainage: serous Odor: none Wound base: granular Lymphangitic streaking? No. 
Undermining? No. 
Sinus tracts? No. 
Probes to bone? No 
Subcutaneous crepitus? No 
Fluctuance? No  
 
Wound #3 Location: left heel DTI Etiology: pressure Size: see nursing notes Margins: rolled / macerated Drainage: none Odor: moderate Wound base: soft wet necrotic skin and fibrotic tissue, granulation tissue at margin Lymphangitic streaking? No. 
Undermining? No. 
Sinus tracts? No. 
Probes to bone? No 
Subcutaneous crepitus? No 
Fluctuance? No  
 
Neurological: 
Protective sensation per 5.07 [place] Annita monofilament is reduced bilaterally Vibratory sensation at the Rt 1st MPJ is present LT 1st MPJ present Epicritic sensation is intact.   
 
Patient is AAOx3, mood is normal.  
 
 Orthopedic: B/L LE are symmetric ROM of ankle, STJ, 1st MTPJ is limited, MMT 5 out of 5 for B/L LE. No pedal amputations Constitutional: Pt is a well developed, overweight  73F. Lymphatics: negative tenderness to palpation of neck/axillary/inguinal nodes. Imaging / Labs / Cx / Px: 
 
4/17/18 JEFF/PVR Right 1.0 from [place] Procedure Note: 
Excisional debridement through level of fat. Location / Ulcer: LT heel Indication: to remove non-viable tissue from wound bed. Consent in chart. Anesthesia: topical lidocaine Instrument: scissors / pickup Residual necrosis: present / scored Bleeding: minimal 
Hemostasis: pressure Pre-Procedure Pain: 0 Post-Procedure Pain: 0 Area debrided < 20 cm sq. Pre-Debridement measurements: see nursing notes Post-Debridement measurements: see nursing notes This is part of a series of staged procedures in an attempt at limb salvage. [facility] IRAIDA Andre, [address] [facility], [address] [place] [address], [address] [facility] P:   F: [phone]

## 2018-09-05 NOTE — WOUND CARE
09/05/18 1056 Wound Heel Left Date First Assessed/Time First Assessed: 06/25/18 1018   POA: Yes  Wound Type: Pressure injury  Location: Heel  Orientation: Left DRESSING STATUS Old drainage Pressure Injury Unstageable Wound Length (cm) 3.5 cm Wound Width (cm) 4.2 cm Wound Depth (cm) 1.1 Wound Surface area (cm^2) 14.7 cm^2 Condition of Base Sl[place];Pink Condition of Edges Open Tissue Type Yellow;Pink Drainage Amount  Moderate Drainage Color Purulent Wound Odor Mild Periwound Skin Condition Edematous Cleansing and Cleansing Agents  Normal saline Wound Leg Medial;Right Date First Assessed/Time First Assessed: 06/11/18 1019   POA: Yes  Wound Type: Venous  Location: Leg  Orientation: Medial;Right DRESSING STATUS Clean, dry, and intact Wound Length (cm) 0.1 cm Wound Width (cm) 0.1 cm Wound Depth (cm) 0.1 Wound Surface area (cm^2) 0.01 cm^2 Condition of Base Epithelializing Drainage Amount  Small Drainage Color Serous Wound Odor None Periwound Skin Condition Intact Wound Leg Right;Lateral;Posterior Date First Assessed/Time First Assessed: 06/11/18 1026   POA: Yes  Wound Type: Venous  Location: Leg  Orientation: Right;Lateral;Posterior DRESSING STATUS Old drainage Wound Length (cm) 2.1 cm Wound Width (cm) 2.5 cm Wound Depth (cm) 0.2 Wound Surface area (cm^2) 5.25 cm^2 Condition of Base Pink;Epithelializing Tissue Type Red;Pink Drainage Amount  Scant Drainage Color Serous Wound Odor None Periwound Skin Condition Edematous Cleansing and Cleansing Agents  Normal saline

## 2018-09-19 ENCOUNTER — HOSPITAL ENCOUNTER (OUTPATIENT)
Dept: WOUND CARE | Age: 73
Discharge: HOME | End: 2018-09-19
Payer: MEDICAID

## 2018-09-19 VITALS
HEART RATE: 70 BPM | TEMPERATURE: 97.9 F | RESPIRATION RATE: 18 BRPM | DIASTOLIC BLOOD PRESSURE: 65 MMHG | SYSTOLIC BLOOD PRESSURE: 162 MMHG

## 2018-09-19 PROCEDURE — 99214 OFFICE O/P EST MOD 30 MIN: CPT

## 2018-09-19 NOTE — WOUND CARE
Podiatric Surgery - [address] - Progress Note Assessment/Plan: 
Right leg venous stasis ulcers (L97.912, I87.331), Left heel DTI (L89.620) - Pt evaluated and treated. - LT heel continues to deepen and remain macerated and malodorous, no annita s/s infection but strong odor, debrided slough as noted below, ulcer is more granular. Will switch to mesalt packing and 1/4 Dakins for decolonization regimen, will also review JEFF to see if there is any vascular procedure possible, as wounds have stagnated. Will continue with GV / BW2D to RLE. 
- Pt encouraged to float heels and wear prevalons, per pt she is doing this, and it has been ordered for her at her SNF by Dr. Janay Hernández F/U 1 week. Subjective: 
Pt complains of wounds to both legs. Neg for fever, chills, nausea, vomiting, chest pain, shortness of breath. HPI: 
Ms. Tavo Summers is a 81F who presents with wounds to her right and left legs. She has had these wounds in the past and has been treated with with topical medication. Sometimes her legs swell. She reports living in a nursing home. The wounds can sometimes be painful. History is somewhat limited. History: 
Bilateral lower leg Allergies Allergen Reactions  Abilify [Aripiprazole] Unknown (comments)  Clozapine Unknown (comments)  Geodon [Ziprasidone Hcl] Unknown (comments)  Haldol [Haloperidol Lactate] Unknown (comments)  Thorazine [Chlorpromazine] Unknown (comments) Family History Problem Relation Age of Onset  Heart Disease Mother Past Medical History:  
Diagnosis Date  Chronic kidney disease  Diabetes ([facility] [address]  Hyperlipidemia  Hypertension  Ill-defined condition Dementia Past Surgical History:  
Procedure Laterality Date  HX HEENT    
 bilateral cataract removed  HX ORTHOPAEDIC  3/7/13 RIGHT SHOULDER REVERSE ARTHROPLASTY Social History Substance Use Topics  Smoking status: Never Smoker  Smokeless tobacco: Never Used  Alcohol use No  
   
History Alcohol Use No  
 
History Drug Use No  
  
History Smoking Status  Never Smoker Smokeless Tobacco  
 Never Used Current Outpatient Prescriptions Medication Sig  
 amLODIPine (NORVASC) 10 mg tablet Take 10 mg by mouth daily.  albuterol-ipratropium (DUO-NEB) 2.5 mg-0.5 mg/3 ml nebu 3 mL by Nebulization route.  albuterol sulfate (PROVENTIL;VENTOLIN) 2.5 mg/0.5 mL nebu nebulizer solution 2.5 mg by Nebulization route every four (4) hours as needed for Wheezing.  lidocaine/benzalkonium chlorid (A+D CRACKED SKIN RELIEF EX) by Apply Externally route.  glipiZIDE (GLUCOTROL) 5 mg tablet Take  by mouth daily.  loperamide (IMMODIUM) 2 mg tablet Take 2 mg by mouth every eight (8) hours as needed for Diarrhea.  lisinopril-hydroCHLOROthiazide (PRINZIDE, ZESTORETIC) 10-12.5 mg per tablet Take 1 Tab by mouth daily.  multivitamin (ONE A DAY) tablet Take 1 Tab by mouth daily.  calcium-vitamin D (OYSTER SHELL CALCIUM-VIT D3) 250-125 mg-unit tablet Take 1 Tab by mouth daily.  acetaminophen (TYLENOL) 325 mg tablet Take 650 mg by mouth every eight (8) hours as needed for Pain.  ascorbic acid, vitamin C, (VITAMIN C) 500 mg tablet Take  by mouth two (2) times a day.  ergocalciferol (ERGOCALCIFEROL) 50,000 unit capsule Take 50,000 Units by mouth.  ondansetron hcl (ZOFRAN) 4 mg tablet Take 4 mg by mouth every six (6) hours as needed for Nausea.  aspirin delayed-release 325 mg tablet Take 1 Tab by mouth daily.  cloNIDine (CATAPRES) 0.1 mg tablet Take 1 Tab by mouth two (2) times a day.  oxyCODONE-acetaminophen (PERCOCET) 5-325 mg per tablet Take 2 Tabs by mouth every four (4) hours as needed for Pain.  furosemide (LASIX) 40 mg tablet 1 po daily  metoprolol (LOPRESSOR) 100 mg tablet Take 1 Tab by mouth two (2) times a day.  pioglitazone (ACTOS) 30 mg tablet Take 1 Tab by mouth daily. No current facility-administered medications for this encounter. Objective: 
Visit Vitals  /65 (BP 1 Location: Right arm)  Pulse 70  Temp 97.9 °F (36.6 °C)  Resp 18 Vascular: 
Right DP 1/4; PT 1/4 Left DP 1/4; PT 1/4 Capillary fill time <2 seconds Edema: bilateral pitting edema to the legs Skin temperature is cool Varicosities are absent Bilateral legs exhibit hemosiderin deposits Dermatological: 
Nails are thickened, elongated, discolored, painful to palpation, 2 mm thick, with subungual debris. RT 4th toenail is loose with some sanguinous drainage, no associated erythema, edema, malodor, minimal sanguinous drainage. Skin is dry and scaly No evidence of tinea pedis There is no maceration of the interspaces of the feet b/l. Hyperkeratotic lesions not present. Wound #1 Location: LT leg posterior Etiology: venous Size: per RN notes Margins: rolled Drainage: serous Odor: none Wound Base: granular Lymphangitic streaking? No. 
Undermining? No. 
Sinus tracts? No. 
Probes to bone? No 
Subcutaneous crepitus? No 
Fluctuance? No  
 
Wound #2 Location: right leg lateral 
Etiology: venous Size: see nursing notes Margins: hyperkeratotic Drainage: serous Odor: none Wound base: granular Lymphangitic streaking? No. 
Undermining? No. 
Sinus tracts? No. 
Probes to bone? No 
Subcutaneous crepitus? No 
Fluctuance? No  
 
Wound #3 Location: left heel DTI Etiology: pressure Size: see nursing notes Margins: rolled / macerated Drainage: none Odor: moderate Wound base: soft wet necrotic skin and fibrotic tissue, granulation tissue at margin Lymphangitic streaking? No. 
Undermining? No. 
Sinus tracts? No. 
Probes to bone? No 
Subcutaneous crepitus? No 
Fluctuance? No  
 
Neurological: 
Protective sensation per 5.07 [place] Annita monofilament is reduced bilaterally Vibratory sensation at the Rt 1st MPJ is present LT 1st MPJ present Epicritic sensation is intact. Patient is AAOx3, mood is normal.  
 
Orthopedic: B/L LE are symmetric ROM of ankle, STJ, 1st MTPJ is limited, MMT 5 out of 5 for B/L LE. No pedal amputations Constitutional: Pt is a well developed, overweight  73F. Lymphatics: negative tenderness to palpation of neck/axillary/inguinal nodes. Imaging / Labs / Cx / Px: 
 
4/17/18 JEFF/PVR Right 1.0 from [place] Procedure Note: 
Excisional debridement through level of fat. Location / Ulcer: LT heel Indication: to remove non-viable tissue from wound bed. Consent in chart. Anesthesia: topical lidocaine Instrument: scissors / pickup Residual necrosis: present / scored Bleeding: minimal 
Hemostasis: pressure Pre-Procedure Pain: 0 Post-Procedure Pain: 0 Area debrided < 20 cm sq. Pre-Debridement measurements: see nursing notes Post-Debridement measurements: see nursing notes This is part of a series of staged procedures in an attempt at limb salvage. [facility] Valeriano Ibarra DPM - [facility], [address] [facility], [facility], [address] [facility] P:   F: [phone]

## 2018-09-19 NOTE — WOUND CARE
09/19/18 1031 Wound Heel Left Date First Assessed/Time First Assessed: 06/25/18 1018   POA: Yes  Wound Type: Pressure injury  Location: Heel  Orientation: Left Wound Length (cm) 3.5 cm Wound Width (cm) 4 cm Wound Depth (cm) 1.5 Wound Surface area (cm^2) 14 cm^2 Change in Wound Size % 46.67 Drainage Amount  Moderate Drainage Color Serosanguinous Wound Odor None Periwound Skin Condition Intact Cleansing and Cleansing Agents  Normal saline Wound Leg Medial;Right Date First Assessed/Time First Assessed: 06/11/18 1019   POA: Yes  Wound Type: Venous  Location: Leg  Orientation: Medial;Right DRESSING STATUS Clean, dry, and intact Wound Length (cm) 1.1 cm Wound Width (cm) 1.4 cm Wound Depth (cm) 0.1 Wound Surface area (cm^2) 1.54 cm^2 Change in Wound Size % -100 Condition of Base [place];Slough Drainage Amount  None Wound Odor None Cleansing and Cleansing Agents  Normal saline Wound Leg Right;Lateral;Posterior Date First Assessed/Time First Assessed: 06/11/18 1026   POA: Yes  Wound Type: Venous  Location: Leg  Orientation: Right;Lateral;Posterior DRESSING STATUS Clean, dry, and intact Wound Length (cm) 2 cm Wound Width (cm) 2.2 cm Wound Depth (cm) 0.1 Wound Surface area (cm^2) 4.4 cm^2 Change in Wound Size % 63.03 Condition of Base [place];Slough Drainage Amount  None Wound Odor None

## 2018-09-26 ENCOUNTER — HOSPITAL ENCOUNTER (OUTPATIENT)
Dept: WOUND CARE | Age: 73
Discharge: HOME | End: 2018-09-26
Payer: MEDICAID

## 2018-09-26 VITALS
SYSTOLIC BLOOD PRESSURE: 145 MMHG | RESPIRATION RATE: 16 BRPM | DIASTOLIC BLOOD PRESSURE: 76 MMHG | TEMPERATURE: 97.7 F | HEART RATE: 61 BPM

## 2018-09-26 PROCEDURE — 74011000250 HC RX REV CODE- 250: Performed by: PODIATRIST

## 2018-09-26 PROCEDURE — 99214 OFFICE O/P EST MOD 30 MIN: CPT

## 2018-09-26 RX ORDER — LIDOCAINE HYDROCHLORIDE 20 MG/ML
JELLY TOPICAL AS NEEDED
Status: DISCONTINUED | OUTPATIENT
Start: 2018-09-26 | End: 2018-09-30 | Stop reason: HOSPADM

## 2018-09-26 RX ADMIN — LIDOCAINE HYDROCHLORIDE: 20 JELLY TOPICAL at 10:31

## 2018-09-26 NOTE — WOUND CARE
09/26/18 1024 Wound Heel Left Date First Assessed/Time First Assessed: 06/25/18 1018   POA: Yes  Wound Type: Pressure injury  Location: Heel  Orientation: Left DRESSING STATUS Clean, dry, and intact Wound Length (cm) 3 cm Wound Width (cm) 3.9 cm Wound Depth (cm) 0.9 Wound Surface area (cm^2) 11.7 cm^2 Change in Wound Size % 55.43 Drainage Amount  Small Drainage Color Serosanguinous Wound Odor None Cleansing and Cleansing Agents  Normal saline Wound Leg Medial;Right Date First Assessed/Time First Assessed: 06/11/18 1019   POA: Yes  Wound Type: Venous  Location: Leg  Orientation: Medial;Right DRESSING STATUS Clean, dry, and intact Wound Length (cm) 1 cm Wound Width (cm) 0.5 cm Wound Depth (cm) 0.1 Wound Surface area (cm^2) 0.5 cm^2 Change in Wound Size % 35.06 Drainage Amount  None Wound Odor None Cleansing and Cleansing Agents  Normal saline Wound Leg Right;Lateral;Posterior Date First Assessed/Time First Assessed: 06/11/18 1026   POA: Yes  Wound Type: Venous  Location: Leg  Orientation: Right;Lateral;Posterior DRESSING STATUS Clean, dry, and intact Wound Length (cm) 2.4 cm Wound Width (cm) 2.5 cm Wound Depth (cm) 0.1 Wound Surface area (cm^2) 6 cm^2 Change in Wound Size % 49.58 Condition of Base Sl[place];Pink Drainage Amount  None Wound Odor None Cleansing and Cleansing Agents  Normal saline

## 2018-09-26 NOTE — WOUND CARE
Podiatric Surgery - [address] - Progress Note Assessment/Plan: 
Right leg venous stasis ulcers (L97.912, I87.331), Left heel DTI (L89.620) - Pt evaluated and treated. - LT heel is improved in size / depth / odor. Will continue with mesalt packing and 1/4 Dakins for decolonization regimen, will also review JEFF to see if there is any vascular procedure possible, as wounds have stagnated. RLE has stagnated so will try Aquacel Ag on lesions. 
- Pt encouraged to float heels and wear prevalons, per pt she is doing this, and it has been ordered for her at her SNF by Dr. Catarino House - F/U 1 week. Subjective: 
Pt complains of wounds to both legs. Negative for fever, chills, nausea, vomiting, chest pain, shortness of breath. HPI: 
Ms. Mayford Gosselin is a 81F who presents with wounds to her right and left legs. She has had these wounds in the past and has been treated with with topical medication. Sometimes her legs swell. She reports living in a nursing home. The wounds can sometimes be painful. History is somewhat limited. History: 
Bilateral lower leg Allergies Allergen Reactions  Abilify [Aripiprazole] Unknown (comments)  Clozapine Unknown (comments)  Geodon [Ziprasidone Hcl] Unknown (comments)  Haldol [Haloperidol Lactate] Unknown (comments)  Thorazine [Chlorpromazine] Unknown (comments) Family History Problem Relation Age of Onset  Heart Disease Mother Past Medical History:  
Diagnosis Date  Chronic kidney disease  Diabetes ([facility] [address]  Hyperlipidemia  Hypertension  Ill-defined condition Dementia Past Surgical History:  
Procedure Laterality Date  HX HEENT    
 bilateral cataract removed  HX ORTHOPAEDIC  3/7/13 RIGHT SHOULDER REVERSE ARTHROPLASTY Social History Substance Use Topics  Smoking status: Never Smoker  Smokeless tobacco: Never Used  Alcohol use No  
   
History Alcohol Use No  
 
History Drug Use No  
  
History Smoking Status  Never Smoker Smokeless Tobacco  
 Never Used Current Outpatient Prescriptions Medication Sig  
 amLODIPine (NORVASC) 10 mg tablet Take 10 mg by mouth daily.  albuterol-ipratropium (DUO-NEB) 2.5 mg-0.5 mg/3 ml nebu 3 mL by Nebulization route.  albuterol sulfate (PROVENTIL;VENTOLIN) 2.5 mg/0.5 mL nebu nebulizer solution 2.5 mg by Nebulization route every four (4) hours as needed for Wheezing.  lidocaine/benzalkonium chlorid (A+D CRACKED SKIN RELIEF EX) by Apply Externally route.  glipiZIDE (GLUCOTROL) 5 mg tablet Take  by mouth daily.  loperamide (IMMODIUM) 2 mg tablet Take 2 mg by mouth every eight (8) hours as needed for Diarrhea.  lisinopril-hydroCHLOROthiazide (PRINZIDE, ZESTORETIC) 10-12.5 mg per tablet Take 1 Tab by mouth daily.  multivitamin (ONE A DAY) tablet Take 1 Tab by mouth daily.  calcium-vitamin D (OYSTER SHELL CALCIUM-VIT D3) 250-125 mg-unit tablet Take 1 Tab by mouth daily.  acetaminophen (TYLENOL) 325 mg tablet Take 650 mg by mouth every eight (8) hours as needed for Pain.  ascorbic acid, vitamin C, (VITAMIN C) 500 mg tablet Take  by mouth two (2) times a day.  ergocalciferol (ERGOCALCIFEROL) 50,000 unit capsule Take 50,000 Units by mouth.  ondansetron hcl (ZOFRAN) 4 mg tablet Take 4 mg by mouth every six (6) hours as needed for Nausea.  aspirin delayed-release 325 mg tablet Take 1 Tab by mouth daily.  cloNIDine (CATAPRES) 0.1 mg tablet Take 1 Tab by mouth two (2) times a day.  oxyCODONE-acetaminophen (PERCOCET) 5-325 mg per tablet Take 2 Tabs by mouth every four (4) hours as needed for Pain.  furosemide (LASIX) 40 mg tablet 1 po daily  metoprolol (LOPRESSOR) 100 mg tablet Take 1 Tab by mouth two (2) times a day.  pioglitazone (ACTOS) 30 mg tablet Take 1 Tab by mouth daily. Current Facility-Administered Medications Medication Dose Route Frequency  lidocaine (XYLOCAINE) 2 % jelly   Topical PRN Objective: 
Visit Vitals  /76  Pulse 61  Temp 97.7 °F (36.5 °C)  Resp 16 Vascular: 
Right DP 1/4; PT 1/4 Left DP 1/4; PT 1/4 Capillary fill time <2 seconds Edema: bilateral pitting edema to the legs Skin temperature is cool Varicosities are absent Bilateral legs exhibit hemosiderin deposits Dermatological: 
Nails are thickened, elongated, discolored, painful to palpation, 2 mm thick, with subungual debris. RT 4th toenail is loose with some sanguinous drainage, no associated erythema, edema, malodor, minimal sanguinous drainage. Skin is dry and scaly No evidence of tinea pedis There is no maceration of the interspaces of the feet b/l. Hyperkeratotic lesions not present. Wound #1 Location: LT leg posterior Etiology: venous Size: per RN notes Margins: rolled Drainage: serous Odor: none Wound Base: granular Lymphangitic streaking? No. 
Undermining? No. 
Sinus tracts? No. 
Probes to bone? No 
Subcutaneous crepitus? No 
Fluctuance? No  
 
Wound #2 Location: right leg lateral 
Etiology: venous Size: see nursing notes Margins: hyperkeratotic Drainage: serous Odor: none Wound base: granular Lymphangitic streaking? No. 
Undermining? No. 
Sinus tracts? No. 
Probes to bone? No 
Subcutaneous crepitus? No 
Fluctuance? No  
 
Wound #3 Location: left heel DTI Etiology: pressure Size: see nursing notes Margins: rolled / macerated Drainage: none Odor: moderate Wound base: soft wet necrotic skin and fibrotic tissue, granulation tissue at margin Lymphangitic streaking? No. 
Undermining? No. 
Sinus tracts? No. 
Probes to bone? No 
Subcutaneous crepitus? No 
Fluctuance? No  
 
Neurological: 
Protective sensation per 5.07 [place] Annita monofilament is reduced bilaterally Vibratory sensation at the Rt 1st MPJ is present LT 1st MPJ present Epicritic sensation is intact.   
 
Patient is AAOx3, mood is normal.  
 
 Orthopedic: B/L LE are symmetric ROM of ankle, STJ, 1st MTPJ is limited, MMT 5 out of 5 for B/L LE. No pedal amputations Constitutional: Pt is a well developed, overweight  73F. Lymphatics: negative tenderness to palpation of neck/axillary/inguinal nodes. Imaging / Labs / Cx / Px: 
 
4/17/18 JEFF/PVR Right 1.0 from [facility] Paulette Maier DPM - Fabi Caballero, [address] [facility], [address] [place] [address], [id number] [facility] P:   F: [phone]

## 2018-10-02 ENCOUNTER — TELEPHONE (OUTPATIENT)
Dept: WOUND CARE | Age: 73
End: 2018-10-02

## 2018-10-03 ENCOUNTER — HOSPITAL ENCOUNTER (OUTPATIENT)
Dept: WOUND CARE | Age: 73
Discharge: HOME | End: 2018-10-03
Payer: MEDICAID

## 2018-10-03 VITALS
DIASTOLIC BLOOD PRESSURE: 74 MMHG | SYSTOLIC BLOOD PRESSURE: 158 MMHG | HEART RATE: 65 BPM | TEMPERATURE: 98.3 F | RESPIRATION RATE: 16 BRPM

## 2018-10-03 PROCEDURE — 99215 OFFICE O/P EST HI 40 MIN: CPT

## 2018-10-03 PROCEDURE — 74011000250 HC RX REV CODE- 250: Performed by: PODIATRIST

## 2018-10-03 RX ORDER — LIDOCAINE HYDROCHLORIDE 20 MG/ML
JELLY TOPICAL AS NEEDED
Status: DISCONTINUED | OUTPATIENT
Start: 2018-10-03 | End: 2018-10-07 | Stop reason: HOSPADM

## 2018-10-03 RX ADMIN — LIDOCAINE HYDROCHLORIDE: 20 JELLY TOPICAL at 10:43

## 2018-10-03 NOTE — WOUND CARE
Podiatric Surgery - [address] - Progress Note Assessment/Plan: 
Right leg venous stasis ulcers (L97.912, I87.331), Left heel DTI (L89.620) - Pt evaluated and treated. - LT heel, lateral RT leg decreased in size and depth, medial RT leg is scabbed over, posterior LT leg appears healed, wounds have no malodor today and are doing excellent. - Will do Aquacel Ag to all remaning lesions with tubigrips. - Pt encouraged to float heels and wear prevalons, per pt she is doing this, and it has been ordered for her at her SNF by Dr. Burke Mcintosh - F/U 1 week. Subjective: 
Pt complains of wounds to both legs. Negative for fever, chills, nausea, vomiting, chest pain, shortness of breath. Notes they are less painful and odorous. HPI: 
Ms. Fany Angelo is a 81F who presents with wounds to her right and left legs. She has had these wounds in the past and has been treated with with topical medication. Sometimes her legs swell. She reports living in a nursing home. The wounds can sometimes be painful. History is somewhat limited. History: 
Bilateral lower leg Allergies Allergen Reactions  Abilify [Aripiprazole] Unknown (comments)  Clozapine Unknown (comments)  Geodon [Ziprasidone Hcl] Unknown (comments)  Haldol [Haloperidol Lactate] Unknown (comments)  Thorazine [Chlorpromazine] Unknown (comments) Family History Problem Relation Age of Onset  Heart Disease Mother Past Medical History:  
Diagnosis Date  Chronic kidney disease  Diabetes ([facility] [address]  Hyperlipidemia  Hypertension  Ill-defined condition Dementia Past Surgical History:  
Procedure Laterality Date  HX HEENT    
 bilateral cataract removed  HX ORTHOPAEDIC  3/7/13 RIGHT SHOULDER REVERSE ARTHROPLASTY Social History Substance Use Topics  Smoking status: Never Smoker  Smokeless tobacco: Never Used  Alcohol use No  
   
History Alcohol Use No  
 
History Drug Use No  
  
History Smoking Status  Never Smoker Smokeless Tobacco  
 Never Used Current Outpatient Prescriptions Medication Sig  
 amLODIPine (NORVASC) 10 mg tablet Take 10 mg by mouth daily.  albuterol-ipratropium (DUO-NEB) 2.5 mg-0.5 mg/3 ml nebu 3 mL by Nebulization route.  albuterol sulfate (PROVENTIL;VENTOLIN) 2.5 mg/0.5 mL nebu nebulizer solution 2.5 mg by Nebulization route every four (4) hours as needed for Wheezing.  lidocaine/benzalkonium chlorid (A+D CRACKED SKIN RELIEF EX) by Apply Externally route.  glipiZIDE (GLUCOTROL) 5 mg tablet Take  by mouth daily.  loperamide (IMMODIUM) 2 mg tablet Take 2 mg by mouth every eight (8) hours as needed for Diarrhea.  lisinopril-hydroCHLOROthiazide (PRINZIDE, ZESTORETIC) 10-12.5 mg per tablet Take 1 Tab by mouth daily.  multivitamin (ONE A DAY) tablet Take 1 Tab by mouth daily.  calcium-vitamin D (OYSTER SHELL CALCIUM-VIT D3) 250-125 mg-unit tablet Take 1 Tab by mouth daily.  acetaminophen (TYLENOL) 325 mg tablet Take 650 mg by mouth every eight (8) hours as needed for Pain.  ascorbic acid, vitamin C, (VITAMIN C) 500 mg tablet Take  by mouth two (2) times a day.  ergocalciferol (ERGOCALCIFEROL) 50,000 unit capsule Take 50,000 Units by mouth.  ondansetron hcl (ZOFRAN) 4 mg tablet Take 4 mg by mouth every six (6) hours as needed for Nausea.  aspirin delayed-release 325 mg tablet Take 1 Tab by mouth daily.  cloNIDine (CATAPRES) 0.1 mg tablet Take 1 Tab by mouth two (2) times a day.  oxyCODONE-acetaminophen (PERCOCET) 5-325 mg per tablet Take 2 Tabs by mouth every four (4) hours as needed for Pain.  furosemide (LASIX) 40 mg tablet 1 po daily  metoprolol (LOPRESSOR) 100 mg tablet Take 1 Tab by mouth two (2) times a day.  pioglitazone (ACTOS) 30 mg tablet Take 1 Tab by mouth daily. Current Facility-Administered Medications Medication Dose Route Frequency  lidocaine (XYLOCAINE) 2 % jelly   Topical PRN Objective: 
Visit Vitals  /74 (BP 1 Location: Right arm, BP Patient Position: At rest)  Pulse 65  Temp 98.3 °F (36.8 °C)  Resp 16 Vascular: 
Right DP 1/4; PT 1/4 Left DP 1/4; PT 1/4 Capillary fill time <2 seconds Edema: bilateral pitting edema to the legs Skin temperature is cool Varicosities are absent Bilateral legs exhibit hemosiderin deposits Dermatological: 
Nails are thickened, elongated, discolored, painful to palpation, 2 mm thick, with subungual debris. RT 4th toenail is loose with some sanguinous drainage, no associated erythema, edema, malodor, minimal sanguinous drainage. Skin is dry and scaly No evidence of tinea pedis There is no maceration of the interspaces of the feet b/l. Hyperkeratotic lesions not present. Wound #1 Location: LT leg posterior 
healed Wound #2 Location: right leg lateral 
Etiology: venous Size: see nursing notes Margins: hyperkeratotic Drainage: serous Odor: none Wound base: granular Lymphangitic streaking? No. 
Undermining? No. 
Sinus tracts? No. 
Probes to bone? No 
Subcutaneous crepitus? No 
Fluctuance? No  
 
Wound #3 Location: left heel DTI Etiology: pressure Size: see nursing notes Margins: rolled / macerated Drainage: none Odor: moderate Wound base: granulation tissue Lymphangitic streaking? No. 
Undermining? No. 
Sinus tracts? No. 
Probes to bone? No 
Subcutaneous crepitus? No 
Fluctuance? No  
 
Wound #4 Location: right leg medial 
Etiology: venous Size: see nursing notes Margins: dry, hemosiderosis Drainage: none Odor: none Wound base: dried scab Lymphangitic streaking? No. 
Undermining? No. 
Sinus tracts? No. 
Probes to bone? No 
Subcutaneous crepitus? No 
Fluctuance? No  
 
Neurological: 
Protective sensation per 5.07 [place] Annita monofilament is reduced bilaterally Vibratory sensation at the Rt 1st MPJ is present LT 1st MPJ present Epicritic sensation is intact. Patient is AAOx3, mood is normal.  
 
Orthopedic: B/L LE are symmetric ROM of ankle, STJ, 1st MTPJ is limited, MMT 5 out of 5 for B/L LE. No pedal amputations Constitutional: Pt is a well developed, overweight  73F. Lymphatics: negative tenderness to palpation of neck/axillary/inguinal nodes. Imaging / Labs / Cx / Px: 
 
4/17/18 JEFF/PVR Right 1.0 from [facility] Tyler Rainey DPM - Cheryle Chance K. [facility], [address] [facility], [address] [place] [address], [address], [address] [place] P:   F: [phone]

## 2018-10-03 NOTE — WOUND CARE
10/03/18 1024 Wound Heel Left Date First Assessed/Time First Assessed: 06/25/18 1018   POA: Yes  Wound Type: Pressure injury  Location: Heel  Orientation: Left DRESSING STATUS Breakthrough drainage; Old drainage DRESSING TYPE Silver products;Gauze;Gauze wrap (tex) Wound Length (cm) 2.3 cm Wound Width (cm) 3 cm Wound Depth (cm) 0.4 Wound Surface area (cm^2) 6.9 cm^2 Change in Wound Size % 73.71 Condition of Base [place];Slough;Granulation Condition of Edges Open Tissue Type Pink;Yellow Drainage Amount  Small Drainage Color Serosanguinous Wound Odor None Periwound Skin Condition Intact Cleansing and Cleansing Agents  Soap and water Wound Leg Medial;Right Date First Assessed/Time First Assessed: 06/11/18 1019   POA: Yes  Wound Type: Venous  Location: Leg  Orientation: Medial;Right DRESSING STATUS Old drainage DRESSING TYPE Silver products;Gauze Non-Pressure Injury Partial thickness (epider/derm) Wound Length (cm) 1 cm Wound Width (cm) 0.2 cm Wound Depth (cm) 0.1 Wound Surface area (cm^2) 0.2 cm^2 Change in Wound Size % 74.03 Condition of Base Pink;Slough Condition of Edges Closed Tissue Type Pink;Yellow Drainage Amount  Small Drainage Color Serous Wound Odor None Periwound Skin Condition Intact Cleansing and Cleansing Agents  Soap and water Wound Leg Right;Lateral;Posterior Date First Assessed/Time First Assessed: 06/11/18 1026   POA: Yes  Wound Type: Venous  Location: Leg  Orientation: Right;Lateral;Posterior DRESSING STATUS Old drainage DRESSING TYPE Silver products;Gauze;Gauze wrap (tex) Non-Pressure Injury Full thickness (subcut/muscle) Wound Length (cm) 2 cm Wound Width (cm) 2 cm Wound Depth (cm) 0.2 Wound Surface area (cm^2) 4 cm^2 Change in Wound Size % 66.39 Condition of Base [place];Slough;Granulation Condition of Edges Open Tissue Type Pink;Yellow Drainage Amount  Moderate Drainage Color Serosanguinous Wound Odor None Cleansing and Cleansing Agents  Soap and water Visit Vitals  /74 (BP 1 Location: Right arm, BP Patient Position: At rest)  Pulse 65  Temp 98.3 °F (36.8 °C)  Resp 16

## 2018-10-23 ENCOUNTER — TELEPHONE (OUTPATIENT)
Dept: WOUND CARE | Age: 73
End: 2018-10-23

## 2018-10-24 ENCOUNTER — HOSPITAL ENCOUNTER (OUTPATIENT)
Dept: WOUND CARE | Age: 73
Discharge: HOME | End: 2018-10-24
Payer: MEDICAID

## 2018-10-24 VITALS
RESPIRATION RATE: 18 BRPM | SYSTOLIC BLOOD PRESSURE: 140 MMHG | HEART RATE: 54 BPM | DIASTOLIC BLOOD PRESSURE: 78 MMHG | TEMPERATURE: 97.5 F

## 2018-10-24 PROCEDURE — 74011000250 HC RX REV CODE- 250: Performed by: PODIATRIST

## 2018-10-24 PROCEDURE — 99215 OFFICE O/P EST HI 40 MIN: CPT

## 2018-10-24 RX ORDER — LIDOCAINE HYDROCHLORIDE 20 MG/ML
JELLY TOPICAL AS NEEDED
Status: DISCONTINUED | OUTPATIENT
Start: 2018-10-24 | End: 2018-10-28 | Stop reason: HOSPADM

## 2018-10-24 RX ADMIN — LIDOCAINE HYDROCHLORIDE: 20 JELLY TOPICAL at 11:32

## 2018-10-24 NOTE — WOUND CARE
Podiatric Surgery - [address] [facility] - Progress Note Assessment/Plan: 
Right leg venous stasis ulcers (L97.912, I87.331), Left heel DTI (L89.620) - Pt evaluated and treated. - Legs exhibit increased swelling with multiple punctate draining sinuses on LLE. LT heel stagnant today, as well as RT lateral leg. - Will continue with double tubigrips, add Aquacel Ag to all new lesions and continue with it to prior lesions, if not better in one week will initiate more aggressive triple compression tx. 
- Pt encouraged to float heels and wear prevalons, per pt she is doing this, and it has been ordered for her at her SNF by Dr. Reyes Clark - F/U 1 week. Subjective: 
Pt complains of wounds to both legs. Negative for fever, chills, nausea, vomiting, chest pain, shortness of breath. Notes they are more painful today and some drainage from LT leg. HPI: 
Ms. Hadley Dxion is a 81F who presents with wounds to her right and left legs. She has had these wounds in the past and has been treated with with topical medication. Sometimes her legs swell. She reports living in a nursing home. The wounds can sometimes be painful. History is somewhat limited. History: 
Bilateral lower leg Allergies Allergen Reactions  Abilify [Aripiprazole] Unknown (comments)  Clozapine Unknown (comments)  Geodon [Ziprasidone Hcl] Unknown (comments)  Haldol [Haloperidol Lactate] Unknown (comments)  Thorazine [Chlorpromazine] Unknown (comments) Family History Problem Relation Age of Onset  Heart Disease Mother Past Medical History:  
Diagnosis Date  Chronic kidney disease  Diabetes ([facility] [address]  Hyperlipidemia  Hypertension  Ill-defined condition Dementia Past Surgical History:  
Procedure Laterality Date  HX HEENT    
 bilateral cataract removed  HX ORTHOPAEDIC  3/7/13 RIGHT SHOULDER REVERSE ARTHROPLASTY Social History Tobacco Use  
  Smoking status: Never Smoker  Smokeless tobacco: Never Used Substance Use Topics  Alcohol use: No  
   
Social History Substance and Sexual Activity Alcohol Use No  
 
Social History Substance and Sexual Activity Drug Use No  
  
Social History Tobacco Use Smoking Status Never Smoker Smokeless Tobacco Never Used Current Outpatient Medications Medication Sig  
 amLODIPine (NORVASC) 10 mg tablet Take 10 mg by mouth daily.  albuterol-ipratropium (DUO-NEB) 2.5 mg-0.5 mg/3 ml nebu 3 mL by Nebulization route.  albuterol sulfate (PROVENTIL;VENTOLIN) 2.5 mg/0.5 mL nebu nebulizer solution 2.5 mg by Nebulization route every four (4) hours as needed for Wheezing.  lidocaine/benzalkonium chlorid (A+D CRACKED SKIN RELIEF EX) by Apply Externally route.  glipiZIDE (GLUCOTROL) 5 mg tablet Take  by mouth daily.  loperamide (IMMODIUM) 2 mg tablet Take 2 mg by mouth every eight (8) hours as needed for Diarrhea.  lisinopril-hydroCHLOROthiazide (PRINZIDE, ZESTORETIC) 10-12.5 mg per tablet Take 1 Tab by mouth daily.  multivitamin (ONE A DAY) tablet Take 1 Tab by mouth daily.  calcium-vitamin D (OYSTER SHELL CALCIUM-VIT D3) 250-125 mg-unit tablet Take 1 Tab by mouth daily.  acetaminophen (TYLENOL) 325 mg tablet Take 650 mg by mouth every eight (8) hours as needed for Pain.  ascorbic acid, vitamin C, (VITAMIN C) 500 mg tablet Take  by mouth two (2) times a day.  ergocalciferol (ERGOCALCIFEROL) 50,000 unit capsule Take 50,000 Units by mouth.  ondansetron hcl (ZOFRAN) 4 mg tablet Take 4 mg by mouth every six (6) hours as needed for Nausea.  aspirin delayed-release 325 mg tablet Take 1 Tab by mouth daily.  cloNIDine (CATAPRES) 0.1 mg tablet Take 1 Tab by mouth two (2) times a day.  oxyCODONE-acetaminophen (PERCOCET) 5-325 mg per tablet Take 2 Tabs by mouth every four (4) hours as needed for Pain.  furosemide (LASIX) 40 mg tablet 1 po daily  metoprolol (LOPRESSOR) 100 mg tablet Take 1 Tab by mouth two (2) times a day.  pioglitazone (ACTOS) 30 mg tablet Take 1 Tab by mouth daily. Current Facility-Administered Medications Medication Dose Route Frequency  lidocaine (XYLOCAINE) 2 % jelly   Topical PRN Objective: 
Visit Vitals /78 (BP 1 Location: Right arm) Pulse (!) 54 Temp 97.5 °F (36.4 °C) Resp 18 Vascular: 
Right DP 1/4; PT 1/4 Left DP 1/4; PT 1/4 Capillary fill time <2 seconds Edema: bilateral pitting edema to the legs Skin temperature is cool Varicosities are absent Bilateral legs exhibit hemosiderin deposits Dermatological: 
Nails are thickened, elongated, discolored, painful to palpation, 2 mm thick, with subungual debris. RT 4th toenail is loose with some sanguinous drainage, no associated erythema, edema, malodor, minimal sanguinous drainage. Skin is dry and scaly No evidence of tinea pedis There is no maceration of the interspaces of the feet b/l. Hyperkeratotic lesions not present. Wound #1 Location: LT leg x 3 (medial / central / lateral) Etiology: venous Size: see nursing notes Margins: macerated / hemosiderotic Drainage: serous Odor: none Wound base: granular Lymphangitic streaking? No. 
Undermining? No. 
Sinus tracts? No. 
Probes to bone? No 
Subcutaneous crepitus? No 
Fluctuance? No  
 
Wound #2 Location: right leg lateral 
Etiology: venous Size: see nursing notes Margins: macerated / hemosiderotic Drainage: serous Odor: none Wound base: granular Lymphangitic streaking? No. 
Undermining? No. 
Sinus tracts? No. 
Probes to bone? No 
Subcutaneous crepitus? No 
Fluctuance? No  
 
Wound #3 Location: left heel DTI Etiology: pressure Size: see nursing notes Margins: rolled / macerated Drainage: none Odor: none Wound base: granulation tissue Lymphangitic streaking? No. 
Undermining? No. 
Sinus tracts? No. 
Probes to bone? No 
Subcutaneous crepitus?  No 
 Fluctuance? No  
 
Wound #4 Location: right leg medial 
Etiology: venous Size: see nursing notes Margins: dry, hemosiderosis Drainage: none Odor: none Wound base: dried scab Lymphangitic streaking? No. 
Undermining? No. 
Sinus tracts? No. 
Probes to bone? No 
Subcutaneous crepitus? No 
Fluctuance? No  
 
Neurological: 
Protective sensation per 5.07 [place] Annita monofilament is reduced bilaterally Vibratory sensation at the Rt 1st MPJ is present LT 1st MPJ present Epicritic sensation is intact. Patient is AAOx3, mood is normal.  
 
Orthopedic: B/L LE are symmetric ROM of ankle, STJ, 1st MTPJ is limited, MMT 5 out of 5 for B/L LE. No pedal amputations Constitutional: Pt is a well developed, overweight  73F. Lymphatics: negative tenderness to palpation of neck/axillary/inguinal nodes. Imaging / Labs / Cx / Px: 
 
4/17/18 JEFF/PVR Right 1.0 from [facility] Shalini Joiner DPM - Felix Joseph, [address] [facility], [facility] [address], [address] [facility] P:   F: [phone]

## 2018-10-24 NOTE — WOUND CARE
10/24/18 1127 Wound Heel Left Date First Assessed/Time First Assessed: 06/25/18 1018   POA: Yes  Wound Type: Pressure injury  Location: Heel  Orientation: Left DRESSING STATUS Old drainage;Removed DRESSING TYPE Silver products;Gauze;Gauze wrap (tex) Wound Length (cm) 2.2 cm Wound Width (cm) 3 cm Wound Depth (cm) 0.2 Wound Surface area (cm^2) 6.6 cm^2 Change in Wound Size % 74.86 Condition of [facility]; White Condition of Edges Open Tissue Type Yellow Drainage Amount  Small Drainage Color Serous Wound Odor None Periwound Skin Condition Intact Cleansing and Cleansing Agents  Normal saline Wound Leg Medial;Right Date First Assessed/Time First Assessed: 06/11/18 1019   POA: Yes  Wound Type: Venous  Location: Leg  Orientation: Medial;Right DRESSING STATUS Clean, dry, and intact Wound Length (cm) 0.1 cm Wound Width (cm) 0.1 cm Wound Depth (cm) 0.1 Wound Surface area (cm^2) 0.01 cm^2 Change in Wound Size % 98.7 Condition of Base Epithelializing Condition of Edges Closed Wound Leg Right;Lateral;Posterior Date First Assessed/Time First Assessed: 06/11/18 1026   POA: Yes  Wound Type: Venous  Location: Leg  Orientation: Right;Lateral;Posterior DRESSING STATUS Old drainage;Removed DRESSING TYPE Silver products;Gauze;Gauze wrap (tex) Non-Pressure Injury Partial thickness (epider/derm) Wound Length (cm) 1.6 cm Wound Width (cm) 1.2 cm Wound Depth (cm) 0.1 Wound Surface area (cm^2) 1.92 cm^2 Change in Wound Size % 83.87 Condition of Base Pink;Slough Condition of Edges Open Tissue Type Pink;Yellow Drainage Amount  Small Drainage Color Serous Wound Odor None Periwound Skin Condition Intact Cleansing and Cleansing Agents  Normal saline There were no vitals taken for this visit.

## 2018-12-05 ENCOUNTER — APPOINTMENT (OUTPATIENT)
Dept: WOUND CARE | Age: 73
End: 2018-12-05